# Patient Record
Sex: FEMALE | Race: WHITE | NOT HISPANIC OR LATINO | Employment: STUDENT | URBAN - METROPOLITAN AREA
[De-identification: names, ages, dates, MRNs, and addresses within clinical notes are randomized per-mention and may not be internally consistent; named-entity substitution may affect disease eponyms.]

---

## 2023-03-28 ENCOUNTER — OFFICE VISIT (OUTPATIENT)
Dept: FAMILY MEDICINE CLINIC | Facility: OTHER | Age: 18
End: 2023-03-28

## 2023-03-28 VITALS
WEIGHT: 157 LBS | OXYGEN SATURATION: 98 % | RESPIRATION RATE: 18 BRPM | DIASTOLIC BLOOD PRESSURE: 80 MMHG | SYSTOLIC BLOOD PRESSURE: 108 MMHG | TEMPERATURE: 98 F | HEART RATE: 71 BPM

## 2023-03-28 DIAGNOSIS — Z11.59 NEED FOR HEPATITIS C SCREENING TEST: ICD-10-CM

## 2023-03-28 DIAGNOSIS — E03.9 HYPOTHYROIDISM, UNSPECIFIED TYPE: Primary | ICD-10-CM

## 2023-03-28 DIAGNOSIS — Z11.4 ENCOUNTER FOR SCREENING FOR HUMAN IMMUNODEFICIENCY VIRUS (HIV): ICD-10-CM

## 2023-03-28 DIAGNOSIS — M25.511 ARTHRALGIA OF RIGHT SHOULDER REGION: ICD-10-CM

## 2023-03-28 DIAGNOSIS — R53.83 OTHER FATIGUE: ICD-10-CM

## 2023-03-28 DIAGNOSIS — M25.552 ARTHRALGIA OF LEFT HIP: ICD-10-CM

## 2023-03-28 DIAGNOSIS — M79.18 MYALGIA, MULTIPLE SITES: ICD-10-CM

## 2023-03-28 RX ORDER — LEVOTHYROXINE AND LIOTHYRONINE 76; 18 UG/1; UG/1
TABLET ORAL
COMMUNITY
Start: 2023-02-13

## 2023-03-28 RX ORDER — EPINEPHRINE 0.3 MG/.3ML
INJECTION SUBCUTANEOUS
COMMUNITY
Start: 2023-01-05

## 2023-03-28 NOTE — PATIENT INSTRUCTIONS
Fatigue   WHAT YOU NEED TO KNOW:   Fatigue is mental and physical exhaustion that does not get better with rest  Fatigue may make daily activities difficult or cause extreme sleepiness  It is normal to feel tired sometimes, but long-term fatigue may be a sign of serious illness  DISCHARGE INSTRUCTIONS:   Return to the emergency department if:   You have chest pain  You have difficulty breathing  Contact your healthcare provider if:   You have a cough that gets worse, or does not go away  You see blood in your urine or bowel movement  You have numbness or tingling around your mouth or in an arm or leg  You faint, feel dizzy, or have vision changes  You have swelling in your lymph nodes  You are a woman and have vaginal bleeding that is not normal for you, or is not expected  You lose weight without trying, or you have trouble eating  You feel weak or have muscle pain  You have pain or swelling in your joints  You have questions or concerns about your condition or care  Follow up with your healthcare provider as directed: You may need more tests  Your healthcare provider may also refer you to a specialist  Write down your questions so you remember to ask them during your visits  Manage fatigue:   Keep a fatigue diary  Include anything that makes you feel more tired or less tired  Bring the diary with you to follow-up visits with your provider  Exercise as directed  Exercise can help you feel more alert  Exercise can also help you manage stress or relieve depression  Try to get at least 30 minutes of exercise most days of the week  Keep a regular sleep schedule  Go to bed and wake up at the same times every day  Limit naps to 1 hour each day  A nap can improve fatigue, but a long nap may make it harder to go to sleep at night  Plan and limit your activities  Limit the number of activities such as shopping and cleaning you do each day   If possible, try to spread out your trips throughout the week  Plan ahead so you are not rushing to get something done  Only do activities that you have the energy to complete  Take breaks between activities  Ask for help if you need it  Another person may be able to drive you or help with daily activities  Eat a variety of healthy foods  Healthy foods include fruits, vegetables, whole-grain breads, low-fat dairy products, beans, lean meats, and fish  Good nutrition can help manage fatigue  Limit caffeine and alcohol  These can make it difficult to fall or stay asleep  Women should limit alcohol to 1 drink a day  Men should limit alcohol to 2 drinks a day  A drink of alcohol is 12 ounces of beer, 5 ounces of wine, or 1½ ounces of liquor  Ask our healthcare provider how much caffeine is safe for you  Do not smoke  Nicotine and other chemicals in cigarettes and cigars can cause lung damage and increase fatigue  Ask your healthcare provider for information if you currently smoke and need help to quit  E-cigarettes or smokeless tobacco still contain nicotine  Talk to your healthcare provider before you use these products  © Copyright [employer] Claude 2022 Information is for End User's use only and may not be sold, redistributed or otherwise used for commercial purposes  The above information is an  only  It is not intended as medical advice for individual conditions or treatments  Talk to your doctor, nurse or pharmacist before following any medical regimen to see if it is safe and effective for you

## 2023-03-28 NOTE — PROGRESS NOTES
Name: Romy Walker      : 2005      MRN: [id number]  Encounter Provider: Gertrude Saeed MD  Encounter Date: 3/28/2023   Encounter department: [address] [place] [address] Dr Castanon  Hypothyroidism, unspecified type  · Abnormal thyroid function + fatigue and myalgias for several years  · Started on [place] (thyroid supplement 2-3 years ago) with no improvement of symptoms  · Discussed with previous provider and d/c medication yesterday  · Will recheck Thyroid labs in 4-6 weeks and decide management course at that time  -     TSH, 3rd generation with Free T4 reflex; Future; Expected date: 2023    2  Other fatigue  · Worsening fatigue and generalized body aches x 2- 3 years  · Abnormal thyroid function detected and started on thyroid supplement without significant improvement  · Also stopped OCPs (Junel Fe) with no change in her symptoms  · Also has arthropathies and follows with ortho and a chiropractor  · Also has life stressors in the form of school  · Cause may be multifactorial; would like to investigate for organic causes  -     Comprehensive metabolic panel; Future  -     CBC and differential; Future  -     Iron Panel (Includes Ferritin, Iron Sat%, Iron, and TIBC); Future  -     Vitamin D 25 hydroxy; Future  -     Vitamin B12; Future  -     Folate; Future    3  Right shoulder pain, unspecified chronicity  · Intermittent right shoulder and right upper back pain w/ associated burning/numbness travelling down the right arm occasionally for several years  · H/O of increased physical activity and athleticism with overuse as potential cause for current symptoms  · H/O of partial labral tear of left hip with piriformis syndrome; follows with ortho, chiropractor and completed several bouts of PT  · Patient requesting MRI of right shoulder;  No previous imaging ever done of joint in question  · Recommend starting with XR and advised to follow up with Ortho for evaluation and MRI order based on their assessment  -     XR shoulder 2+ vw right; Future; Expected date: 03/28/2023  -     XR spine thoracic 3 vw; Future; Expected date: 03/28/2023    4  Need for hepatitis C screening test  -     Hepatitis C Antibody ([facility]); Future    5  Encounter for screening for human immunodeficiency virus (HIV)  -     : HIV 1/2 AB/AG w Reflex [facility] for 2 yr old and above; Future    F/U in 1-2 months for Annual Physical and to review labs/imaging       Subjective      HPI   Severa Maxin is a 21yoF who presents as a new patient to establish care and for acute concerns  She attends [employer] and is studying to become a PA  She has a past medical history significant for thyroid dysfunction as well as a prolonged history of fatigue and generalized body aches  She was started on a thyroid supplement called Ketty and discontinued her OCPs (Junel Fe) with no improvement of her fatigue or generalized myalgias  Today the patient reports that she discussed her concerns with her previous doctor and asked if she could stop taking the thyroid supplement medication  She feels like she does not truly have hypothyroidism  She discontinued her medication yesterday with the blessing of the provider  At this time, she has not experienced any immediate adverse reactions after stopping the medication  She is also considering restarting her birth control since she did not witness any improvements/chnages after it's discontinuation but plans to discuss further with OB/GYN  The patient also has a history of a partial labral tear of her left hip that was being managed by her orthopedic doctor and underwent multiple bouts of physical therapy  She had a more recent diagnosis of piriformis syndrome that is contributing to the pathology of her left hip and is still trying to find more time to incorporate home exercises to manage those symptoms   History is also pertinent for a bulging disc @ L4/L5 and she follows with a chiropractor  Today, the patient reports chronic pain of the right shoulder and right upper back that has not been addressed since she has been focusing more on her left hip pathology  She states that she often experiences some burning and numbness travelling down the right arm intermittently  Prior to these arthropathy diagnoses, the patient was very active and athletic  She denies other concerns at this time including headache, cough, CP, SOB, abdominal pain, N/V/D or weakness  Review of Systems   Constitutional: Positive for fatigue  Negative for chills and fever  HENT: Negative for congestion and rhinorrhea  Eyes: Negative for visual disturbance  Respiratory: Negative for shortness of breath  Cardiovascular: Negative for chest pain  Gastrointestinal: Negative for abdominal pain  Genitourinary: Negative for dysuria  Musculoskeletal: Positive for arthralgias, back pain and myalgias  Negative for joint swelling and neck pain  Neurological: Negative for dizziness and headaches  Current Outpatient Medications on File Prior to Visit   Medication Sig   • EPINEPHrine (EPIPEN) 0 3 mg/0 3 mL SOAJ 0 3 MILLIGRAMS INTRAMUSCULAR 1X USE AS DIRECTED  IF USED, MUST CALL 911 AND PROCEED TO NEAREST ER  • thyroid (ARMOUR) 120 MG tablet  (Patient not taking: Reported on 3/28/2023)       Objective     /80   Pulse 71   Temp 98 °F (36 7 °C)   Resp 18   Wt 71 2 kg (157 lb)   SpO2 98%     Physical Exam  Vitals and nursing note reviewed  Constitutional:       General: She is not in acute distress  Appearance: Normal appearance  HENT:      Head: Normocephalic and atraumatic  Eyes:      Extraocular Movements: Extraocular movements intact  Conjunctiva/sclera: Conjunctivae normal    Cardiovascular:      Rate and Rhythm: Normal rate and regular rhythm  Pulses: Normal pulses  Heart sounds: Normal heart sounds  No murmur heard    Pulmonary:      Effort: Pulmonary effort is normal  No respiratory distress  Breath sounds: Normal breath sounds  Abdominal:      Palpations: Abdomen is soft  Tenderness: There is no abdominal tenderness  Musculoskeletal:      Cervical back: Normal range of motion and neck supple  Skin:     General: Skin is warm and dry  Neurological:      General: No focal deficit present  Mental Status: She is alert and oriented to person, place, and time  Cranial Nerves: No cranial nerve deficit  Sensory: No sensory deficit  Motor: No weakness  Psychiatric:         Mood and Affect: Mood normal  Mood is not anxious           Behavior: Behavior normal        Connie Watson MD

## 2023-04-26 ENCOUNTER — APPOINTMENT (OUTPATIENT)
Dept: LAB | Facility: CLINIC | Age: 18
End: 2023-04-26

## 2023-04-26 DIAGNOSIS — Z11.4 ENCOUNTER FOR SCREENING FOR HUMAN IMMUNODEFICIENCY VIRUS (HIV): ICD-10-CM

## 2023-04-26 DIAGNOSIS — M79.18 MYALGIA, MULTIPLE SITES: ICD-10-CM

## 2023-04-26 DIAGNOSIS — M25.552 ARTHRALGIA OF LEFT HIP: ICD-10-CM

## 2023-04-26 DIAGNOSIS — R53.83 OTHER FATIGUE: ICD-10-CM

## 2023-04-26 DIAGNOSIS — M25.511 ARTHRALGIA OF RIGHT SHOULDER REGION: ICD-10-CM

## 2023-04-26 LAB
25(OH)D3 SERPL-MCNC: 20.1 NG/ML (ref 30–100)
ALBUMIN SERPL BCP-MCNC: 4.1 G/DL (ref 3.5–5)
ALP SERPL-CCNC: 68 U/L (ref 46–116)
ALT SERPL W P-5'-P-CCNC: 36 U/L (ref 12–78)
ANA SER QL IA: NEGATIVE
ANION GAP SERPL CALCULATED.3IONS-SCNC: 7 MMOL/L (ref 4–13)
AST SERPL W P-5'-P-CCNC: 19 U/L (ref 5–45)
BASOPHILS # BLD AUTO: 0.04 THOUSANDS/ΜL (ref 0–0.1)
BASOPHILS NFR BLD AUTO: 1 % (ref 0–1)
BILIRUB SERPL-MCNC: 0.28 MG/DL (ref 0.2–1)
BUN SERPL-MCNC: 11 MG/DL (ref 5–25)
CALCIUM SERPL-MCNC: 9.4 MG/DL (ref 8.3–10.1)
CHLORIDE SERPL-SCNC: 108 MMOL/L (ref 96–108)
CO2 SERPL-SCNC: 23 MMOL/L (ref 21–32)
CREAT SERPL-MCNC: 0.82 MG/DL (ref 0.6–1.3)
CRP SERPL QL: <3 MG/L
EOSINOPHIL # BLD AUTO: 0.06 THOUSAND/ΜL (ref 0–0.61)
EOSINOPHIL NFR BLD AUTO: 1 % (ref 0–6)
ERYTHROCYTE [DISTWIDTH] IN BLOOD BY AUTOMATED COUNT: 13.3 % (ref 11.6–15.1)
ERYTHROCYTE [SEDIMENTATION RATE] IN BLOOD: 5 MM/HOUR (ref 0–19)
FERRITIN SERPL-MCNC: 11 NG/ML (ref 8–388)
FOLATE SERPL-MCNC: >20 NG/ML (ref 3.1–17.5)
GFR SERPL CREATININE-BSD FRML MDRD: 103 ML/MIN/1.73SQ M
GLUCOSE P FAST SERPL-MCNC: 89 MG/DL (ref 65–99)
HCT VFR BLD AUTO: 39.1 % (ref 34.8–46.1)
HCV AB SER QL: NORMAL
HGB BLD-MCNC: 12.4 G/DL (ref 11.5–15.4)
HIV 1+2 AB+HIV1 P24 AG SERPL QL IA: NORMAL
HIV 2 AB SERPL QL IA: NORMAL
HIV1 AB SERPL QL IA: NORMAL
HIV1 P24 AG SERPL QL IA: NORMAL
IMM GRANULOCYTES # BLD AUTO: 0.01 THOUSAND/UL (ref 0–0.2)
IMM GRANULOCYTES NFR BLD AUTO: 0 % (ref 0–2)
IRON SATN MFR SERPL: 18 % (ref 15–50)
IRON SERPL-MCNC: 89 UG/DL (ref 50–170)
LYMPHOCYTES # BLD AUTO: 1.55 THOUSANDS/ΜL (ref 0.6–4.47)
LYMPHOCYTES NFR BLD AUTO: 35 % (ref 14–44)
MCH RBC QN AUTO: 29.7 PG (ref 26.8–34.3)
MCHC RBC AUTO-ENTMCNC: 31.7 G/DL (ref 31.4–37.4)
MCV RBC AUTO: 94 FL (ref 82–98)
MONOCYTES # BLD AUTO: 0.36 THOUSAND/ΜL (ref 0.17–1.22)
MONOCYTES NFR BLD AUTO: 8 % (ref 4–12)
NEUTROPHILS # BLD AUTO: 2.45 THOUSANDS/ΜL (ref 1.85–7.62)
NEUTS SEG NFR BLD AUTO: 55 % (ref 43–75)
NRBC BLD AUTO-RTO: 0 /100 WBCS
PLATELET # BLD AUTO: 372 THOUSANDS/UL (ref 149–390)
PMV BLD AUTO: 10.2 FL (ref 8.9–12.7)
POTASSIUM SERPL-SCNC: 4.3 MMOL/L (ref 3.5–5.3)
PROT SERPL-MCNC: 7.5 G/DL (ref 6.4–8.4)
RBC # BLD AUTO: 4.18 MILLION/UL (ref 3.81–5.12)
SODIUM SERPL-SCNC: 138 MMOL/L (ref 135–147)
TIBC SERPL-MCNC: 492 UG/DL (ref 250–450)
TSH SERPL DL<=0.05 MIU/L-ACNC: 1.86 UIU/ML (ref 0.45–4.5)
VIT B12 SERPL-MCNC: 704 PG/ML (ref 100–900)
WBC # BLD AUTO: 4.47 THOUSAND/UL (ref 4.31–10.16)

## 2023-04-27 ENCOUNTER — APPOINTMENT (OUTPATIENT)
Dept: RADIOLOGY | Facility: CLINIC | Age: 18
End: 2023-04-27

## 2023-04-27 DIAGNOSIS — M25.511 ARTHRALGIA OF RIGHT SHOULDER REGION: ICD-10-CM

## 2023-04-27 LAB — RHEUMATOID FACT SER QL LA: NEGATIVE

## 2023-04-28 LAB
CARDIOLIPIN IGA SER IA-ACNC: 1.4
CARDIOLIPIN IGG SER IA-ACNC: 0.9
CARDIOLIPIN IGM SER IA-ACNC: 2

## 2023-05-04 ENCOUNTER — OFFICE VISIT (OUTPATIENT)
Dept: FAMILY MEDICINE CLINIC | Facility: OTHER | Age: 18
End: 2023-05-04

## 2023-05-04 VITALS
BODY MASS INDEX: 24.77 KG/M2 | OXYGEN SATURATION: 98 % | HEIGHT: 67 IN | TEMPERATURE: 98 F | DIASTOLIC BLOOD PRESSURE: 80 MMHG | SYSTOLIC BLOOD PRESSURE: 104 MMHG | RESPIRATION RATE: 18 BRPM | HEART RATE: 67 BPM | WEIGHT: 157.8 LBS

## 2023-05-04 DIAGNOSIS — R53.83 OTHER FATIGUE: ICD-10-CM

## 2023-05-04 DIAGNOSIS — E55.9 VITAMIN D INSUFFICIENCY: ICD-10-CM

## 2023-05-04 DIAGNOSIS — M25.511 ARTHRALGIA OF RIGHT SHOULDER REGION: ICD-10-CM

## 2023-05-04 DIAGNOSIS — Z00.00 ANNUAL PHYSICAL EXAM: Primary | ICD-10-CM

## 2023-05-04 DIAGNOSIS — E03.9 HYPOTHYROIDISM, UNSPECIFIED TYPE: ICD-10-CM

## 2023-05-04 RX ORDER — MINOCYCLINE 40 MG/G
AEROSOL, FOAM TOPICAL
COMMUNITY
Start: 2023-05-01

## 2023-05-04 NOTE — PROGRESS NOTES
ADULT ANNUAL [address]  [facility]    NAME: Sang Alvarez  AGE: 19 y o  SEX: female  : 2005     DATE: 2023     Assessment and Plan:     Problem List Items Addressed This Visit    None  Visit Diagnoses     Annual physical exam    -  Primary    BMI 24 0-24 9, adult        Hypothyroidism, unspecified type  - Discontinued [place] (thyroid supplement appx 1 month ago)  - Repeat labs show normal TSH  - Past medical records and labs received confirming diagnosis of hypothyroidism  - Will recheck TSH labs in 6 months        Relevant Orders    TSH, 3rd generation with Free T4 reflex    Vitamin D insufficiency   - recommend taking vitamin D 1,000-2,000 units OTC daily       Other fatigue  - all other labs were normal        Arthralgia of right shoulder region    XR was normal  Inflammatory markers WNL        Patient would like to return to discuss birth control options and anxiety  Immunizations and preventive care screenings were discussed with patient today  Appropriate education was printed on patient's after visit summary  Counseling:  Alcohol/drug use: discussed moderation in alcohol intake, the recommendations for healthy alcohol use, and avoidance of illicit drug use  Dental Health: discussed importance of regular tooth brushing, flossing, and dental visits  Injury prevention: discussed safety/seat belts, safety helmets, smoke detectors, carbon dioxide detectors, and smoking near bedding or upholstery  Sexual health: discussed sexually transmitted diseases, partner selection, use of condoms, avoidance of unintended pregnancy, and contraceptive alternatives  · Exercise: the importance of regular exercise/physical activity was discussed  Recommend exercise 3-5 times per week for at least 30 minutes  Depression Screening and Follow-up Plan: Patient's depression screening was positive with a PHQ-2 score of 5  Their PHQ-9 score was 12  Patient assessed for underlying major depression  Brief counseling provided and recommend additional follow-up/re-evaluation next office visit  Return in 1 year (on 2024) for Discuss anxiety and treatment options and birth control  Chief Complaint:     Chief Complaint   Patient presents with    Physical Exam      History of Present Illness:     Adult Annual Physical   Patient here for a comprehensive physical exam  The patient reports extensive history of generalized pain and joint issues  Follows with an orthopedic doctor  Recent evaluation for rheumatologic cause with inflammatory markers were negative  Patient also sent in her medical records which confirmed diagnosis of hypothyroidism  Patient reports no change after discontinuing thyroid supplement and labs show TSH in normal range  The patient also reports history of anxiety and follows with a therapist at school, however has not undergone formal evaluation  Diet and Physical Activity  · Diet/Nutrition: well balanced diet, consuming 3-5 servings of fruits/vegetables daily, adequate fiber intake and adequate whole grain intake  · Exercise: walking, moderate cardiovascular exercise, 5-7 times a week on average and 1-2 hours on average  Depression Screening  PHQ-2/9 Depression Screening    Little interest or pleasure in doing things: 3 - nearly every day  Feeling down, depressed, or hopeless: 2 - more than half the days  Trouble falling or staying asleep, or sleeping too much: 1 - several days  Feeling tired or having little energy: 3 - nearly every day  Poor appetite or overeatin - more than half the days  Feeling bad about yourself - or that you are a failure or have let yourself or your family down: 1 - several days  Trouble concentrating on things, such as reading the newspaper or watching television: 0 - not at all  Moving or speaking so slowly that other people could have noticed   Or the opposite - being so fidgety or restless that you have been moving around a lot more than usual: 0 - not at all  Thoughts that you would be better off dead, or of hurting yourself in some way: 0 - not at all  PHQ-2 Score: 5  PHQ-2 Interpretation: POSITIVE depression screen  PHQ-9 Score: 12   PHQ-9 Interpretation: Moderate depression        WARREN-7 Flowsheet Screening    Flowsheet Row Most Recent Value   Over the last 2 weeks, how often have you been bothered by any of the following problems? Feeling nervous, anxious, or on edge 3   Not being able to stop or control worrying 1   Worrying too much about different things 2   Trouble relaxing 3   Being so restless that it is hard to sit still 1   Becoming easily annoyed or irritable 0   Feeling afraid as if something awful might happen 0   WARREN-7 Total Score 10          General Health  · Sleep: sleeps well and gets more than 8 hours of sleep on average  · Hearing: normal - bilateral   · Vision: no vision problems and most recent eye exam >1 year ago  · Dental: regular dental visits, brushes teeth twice daily and flosses teeth occasionally  /GYN Health  · Last menstrual period: 1 month ago  · Contraceptive method: none right now, would like to restart  · History of STDs?: no      Review of Systems:     Review of Systems   Constitutional: Positive for fatigue  Negative for chills and fever  HENT: Negative for congestion and rhinorrhea  Eyes: Negative for visual disturbance  Respiratory: Negative for shortness of breath  Cardiovascular: Negative for chest pain  Gastrointestinal: Negative for abdominal pain  Genitourinary: Negative for dysuria  Musculoskeletal: Positive for arthralgias, back pain and myalgias  Negative for joint swelling and neck pain  Neurological: Negative for dizziness and headaches        Past Medical History:     Past Medical History:   Diagnosis Date    Hypothyroidism       Past Surgical History:     Past Surgical History:   Procedure Laterality Date "   WISDOM TOOTH EXTRACTION        Social History:     Social History     Socioeconomic History    Marital status: Single     Spouse name: None    Number of children: None    Years of education: None    Highest education level: None   Occupational History    None   Tobacco Use    Smoking status: Never    Smokeless tobacco: Never   Vaping Use    Vaping Use: Never used   Substance and Sexual Activity    Alcohol use: Yes     Comment: social    Drug use: Never    Sexual activity: None   Other Topics Concern    None   Social History Narrative    None     Social Determinants of Health     Financial Resource Strain: Not on file   Food Insecurity: Not on file   Transportation Needs: Not on file   Physical Activity: Not on file   Stress: Not on file   Social Connections: Not on file   Intimate Partner Violence: Not on file   Housing Stability: Not on file      Family History:     History reviewed  No pertinent family history  Current Medications:     Current Outpatient Medications   Medication Sig Dispense Refill    Amzeeq 4 % FOAM Apply daily FOR acne using A SMALL AMOUNT      EPINEPHrine (EPIPEN) 0 3 mg/0 3 mL SOAJ 0 3 MILLIGRAMS INTRAMUSCULAR 1X USE AS DIRECTED  IF USED, MUST CALL 911 AND PROCEED TO NEAREST ER   thyroid (ARMOUR) 120 MG tablet  (Patient not taking: Reported on 3/28/2023)       No current facility-administered medications for this visit  Allergies: Allergies   Allergen Reactions    Fish Allergy - Food Allergy Itching, Swelling, Throat Swelling and Tongue Swelling    Shellfish Allergy - Food Allergy Cough, Itching, Swelling, Throat Swelling and Tongue Swelling      Physical Exam:     /80   Pulse 67   Temp 98 °F (36 7 °C)   Resp 18   Ht 5' 7\" (1 702 m)   Wt 71 6 kg (157 lb 12 8 oz)   SpO2 98%   BMI 24 71 kg/m²     Physical Exam  Vitals and nursing note reviewed  Constitutional:       General: She is not in acute distress  Appearance: Normal appearance   She " is well-developed  HENT:      Head: Normocephalic and atraumatic  Right Ear: Tympanic membrane, ear canal and external ear normal       Left Ear: Tympanic membrane, ear canal and external ear normal       Nose: Nose normal       Mouth/Throat:      Mouth: Mucous membranes are moist       Pharynx: Oropharynx is clear  Eyes:      Extraocular Movements: Extraocular movements intact  Conjunctiva/sclera: Conjunctivae normal    Cardiovascular:      Rate and Rhythm: Normal rate and regular rhythm  Heart sounds: Normal heart sounds  No murmur heard  Pulmonary:      Effort: Pulmonary effort is normal  No respiratory distress  Breath sounds: Normal breath sounds  Abdominal:      General: Bowel sounds are normal       Palpations: Abdomen is soft  Tenderness: There is no abdominal tenderness  Musculoskeletal:      Cervical back: Normal range of motion and neck supple  Skin:     General: Skin is warm and dry  Neurological:      General: No focal deficit present  Mental Status: She is alert and oriented to person, place, and time     Psychiatric:         Behavior: Behavior normal           Mandi Frost MD   [facility] [email or web address]] 81

## 2023-05-04 NOTE — PATIENT INSTRUCTIONS
TAKE VITAMIN D OVER THE COUNTER: 1,000 - 2,000 MCG DAILY  Wellness Visit for Adults   AMBULATORY CARE:   A wellness visit  is when you see your healthcare provider to get screened for health problems  Your healthcare provider will also give you advice on how to stay healthy  Write down your questions so you remember to ask them  Ask your healthcare provider how often you should have a wellness visit  What happens at a wellness visit:  Your healthcare provider will ask about your health, and your family history of health problems  This includes high blood pressure, heart disease, and cancer  He or she will ask if you have symptoms that concern you, if you smoke, and about your mood  You may also be asked about your intake of medicines, supplements, food, and alcohol  Any of the following may be done: Your weight  will be checked  Your height may also be checked so your body mass index (BMI) can be calculated  Your BMI shows if you are at a healthy weight  Your blood pressure  and heart rate will be checked  Your temperature may also be checked  Blood and urine tests  may be done  Blood tests may be done to check your cholesterol levels  Abnormal cholesterol levels increase your risk for heart disease and stroke  You may also need a blood or urine test to check for diabetes if you are at increased risk  Urine tests may be done to look for signs of an infection or kidney disease  A physical exam  includes checking your heartbeat and lungs with a stethoscope  Your healthcare provider may also check your skin to look for sun damage  Screening tests  may be recommended  A screening test is done to check for diseases that may not cause symptoms  The screening tests you may need depend on your age, gender, family history, and lifestyle habits  For example, colorectal screening may be recommended if you are 48years old or older      Screening tests you need if you are a woman:   A Pap smear  is used to screen for cervical cancer  Pap smears are usually done every 3 to 5 years depending on your age  You may need them more often if you have had abnormal Pap smear test results in the past  Ask your healthcare provider how often you should have a Pap smear  A mammogram  is an x-ray of your breasts to screen for breast cancer  Experts recommend mammograms every 2 years starting at age 48 years  You may need a mammogram at age 52 years or younger if you have an increased risk for breast cancer  Talk to your healthcare provider about when you should start having mammograms and how often you need them  Vaccines you may need:   Get an influenza vaccine  every year  The influenza vaccine protects you from the flu  Several types of viruses cause the flu  The viruses change over time, so new vaccines are made each year  Get a tetanus-diphtheria (Td) booster vaccine  every 10 years  This vaccine protects you against tetanus and diphtheria  Tetanus is a severe infection that may cause painful muscle spasms and lockjaw  Diphtheria is a severe bacterial infection that causes a thick covering in the back of your mouth and throat  Get a human papillomavirus (HPV) vaccine  if you are female and aged 23 to 32 or male 23 to 24 and never received it  This vaccine protects you from HPV infection  HPV is the most common infection spread by sexual contact  HPV may also cause vaginal, penile, and anal cancers  Get a pneumococcal vaccine  if you are aged 72 years or older  The pneumococcal vaccine is an injection given to protect you from pneumococcal disease  Pneumococcal disease is an infection caused by pneumococcal bacteria  The infection may cause pneumonia, meningitis, or an ear infection  Get a shingles vaccine  if you are 60 or older, even if you have had shingles before  The shingles vaccine is an injection to protect you from the varicella-zoster virus  This is the same virus that causes chickenpox   Shingles is a painful rash that develops in people who had chickenpox or have been exposed to the virus  How to eat healthy:  My Plate is a model for planning healthy meals  It shows the types and amounts of foods that should go on your plate  Fruits and vegetables make up about half of your plate, and grains and protein make up the other half  A serving of dairy is included on the side of your plate  The amount of calories and serving sizes you need depends on your age, gender, weight, and height  Examples of healthy foods are listed below:  Eat a variety of vegetables  such as dark green, red, and orange vegetables  You can also include canned vegetables low in sodium (salt) and frozen vegetables without added butter or sauces  Eat a variety of fresh fruits , canned fruit in 100% juice, frozen fruit, and dried fruit  Include whole grains  At least half of the grains you eat should be whole grains  Examples include whole-wheat bread, wheat pasta, brown rice, and whole-grain cereals such as oatmeal     Eat a variety of protein foods such as seafood (fish and shellfish), lean meat, and poultry without skin (turkey and chicken)  Examples of lean meats include pork leg, shoulder, or tenderloin, and beef round, sirloin, tenderloin, and extra lean ground beef  Other protein foods include eggs and egg substitutes, beans, peas, soy products, nuts, and seeds  Choose low-fat dairy products such as skim or 1% milk or low-fat yogurt, cheese, and cottage cheese  Limit unhealthy fats  such as butter, hard margarine, and shortening  Exercise:  Exercise at least 30 minutes per day on most days of the week  Some examples of exercise include walking, biking, dancing, and swimming  You can also fit in more physical activity by taking the stairs instead of the elevator or parking farther away from stores  Include muscle strengthening activities 2 days each week  Regular exercise provides many health benefits   It helps you manage your weight, and decreases your risk for type 2 diabetes, heart disease, stroke, and high blood pressure  Exercise can also help improve your mood  Ask your healthcare provider about the best exercise plan for you  General health and safety guidelines:   Do not smoke  Nicotine and other chemicals in cigarettes and cigars can cause lung damage  Ask your healthcare provider for information if you currently smoke and need help to quit  E-cigarettes or smokeless tobacco still contain nicotine  Talk to your healthcare provider before you use these products  Limit alcohol  A drink of alcohol is 12 ounces of beer, 5 ounces of wine, or 1½ ounces of liquor  Lose weight, if needed  Being overweight increases your risk of certain health conditions  These include heart disease, high blood pressure, type 2 diabetes, and certain types of cancer  Protect your skin  Do not sunbathe or use tanning beds  Use sunscreen with a SPF 15 or higher  Apply sunscreen at least 15 minutes before you go outside  Reapply sunscreen every 2 hours  Wear protective clothing, hats, and sunglasses when you are outside  Drive safely  Always wear your seatbelt  Make sure everyone in your car wears a seatbelt  A seatbelt can save your life if you are in an accident  Do not use your cell phone when you are driving  This could distract you and cause an accident  Pull over if you need to make a call or send a text message  Practice safe sex  Use latex condoms if are sexually active and have more than one partner  Your healthcare provider may recommend screening tests for sexually transmitted infections (STIs)  Wear helmets, lifejackets, and protective gear  Always wear a helmet when you ride a bike or motorcycle, go skiing, or play sports that could cause a head injury  Wear protective equipment when you play sports  Wear a lifejacket when you are on a boat or doing water sports      © Copyright [employer]ative 2022 Information is for End User's use only and may not be sold, redistributed or otherwise used for commercial purposes  The above information is an  only  It is not intended as medical advice for individual conditions or treatments  Talk to your doctor, nurse or pharmacist before following any medical regimen to see if it is safe and effective for you

## 2023-05-16 ENCOUNTER — TELEMEDICINE (OUTPATIENT)
Dept: FAMILY MEDICINE CLINIC | Facility: OTHER | Age: 18
End: 2023-05-16

## 2023-05-16 DIAGNOSIS — Z30.09 ENCOUNTER FOR COUNSELING REGARDING CONTRACEPTION: ICD-10-CM

## 2023-05-16 DIAGNOSIS — F41.1 GAD (GENERALIZED ANXIETY DISORDER): Primary | ICD-10-CM

## 2023-05-16 DIAGNOSIS — F32.A DEPRESSION, UNSPECIFIED DEPRESSION TYPE: ICD-10-CM

## 2023-05-16 NOTE — PROGRESS NOTES
Virtual Regular Visit    Verification of patient location:    Patient is located at Other in the following state in which I hold an active license [place]    Assessment/Plan:    Problem List Items Addressed This Visit    None  Visit Diagnoses     WARREN (generalized anxiety disorder)  -  Primary  · GAD7: 10  · Onset 3-4 years ago, but worsened within the past year  · Started seeing her school counselor in college which has been somewhat helpful  · Patient is considering starting pharmacotherapy  · Patient is finished with the school year and will be returning home in [place] for the summer, therefore she would like to establish care with a local therapist and plans to re-evaluate need for medication in the near future    Depression, unspecified depression type  · PHQ9: 12  · Denies thoughts of self-harm, SI/HI        Encounter for counseling regarding contraception  · Previously taking OCPs (Junel Fe) 3-4 years ago but d/c in 10/2022 because she thought it contributed to fatigue and generalized body aches, however there was no improvement of her symptoms after she stopped taking  · Patient would like to restart birth control  She is considering OCPs vs IUD (for less systemic hormonal distribution)  · Pt not currently sexually active and plans to consider her options before making a decision  · Pt advised to use barrier contraceptives if engaging in sexual activity          F/U in 3 months to recheck mood and birth control options      Depression Screening Follow-up Plan: Patient's depression screening was positive with a PHQ-2 score of   Their PHQ-9 score was 12  Patient assessed for underlying major depression  They have no active suicidal ideations  Brief counseling provided and recommend additional follow-up/re-evaluation next office visit  Continue regular follow-up with their psychologist/therapist/psychiatrist who is managing their mental health condition(s)      Reason for visit is   Chief Complaint   Patient presents with   • Virtual Regular Visit      Encounter provider Noe Askew MD    Provider located at [address] [place] [id number]      Recent Visits  Date Type Provider Dept   05/16/23 Telemedicine Noe Askew MD [facility]   Showing recent visits within past 7 days and meeting all other requirements  Future Appointments  No visits were found meeting these conditions  Showing future appointments within next 150 days and meeting all other requirements       The patient was identified by name and date of birth  Kirsty Dangelo was informed that this is a telemedicine visit and that the visit is being conducted through the [employer]  She agrees to proceed     My office door was closed  No one else was in the room  She acknowledged consent and understanding of privacy and security of the video platform  The patient has agreed to participate and understands they can discontinue the visit at any time  Patient is aware this is a billable service  Subjective  Kirsty Dangelo is a 19 y o  female who presents via telemedicine visit to discuss symptoms of anxiety and depression  The patient reports that she started having symptoms of excessive worrying, trouble concentrating and feeling tense several years ago, during her pam year of HS  She has been dealing with life stressors including college and a sequelae of MSK pain following a sports injury  She also reports to a lesser degree, feeling depressed with low energy and change in appetite  The patient states that her symptoms worsened around March 2022 and she started seeing the school counselor at her college  She has been seeing them for approximately 1 year and endorses some improvement of her mood but it still fluctuates and does not feel adequately addressed  She is considering starting pharmacotherapy but would like to wait awhile before making that decision   The patient just completed this academic school year, temporarily removing one of her life stressors  She will be returning home for the summer and plans to establish care with a local behavioral counselor  She also reports that she is scheduled to follow up with her ortho specialist to address MSK concerns and possibly obtain an MRI, therein addressing another life stressor which will be helpful in improving her mood and disposition  The patient also wanted to discuss restarting birth control and wanted to discuss her options  She started taking OCPs (Junel Fe) 3-4 years ago, around the time that she started having the change in her mood  She also noticed some weight gain initially after starting the OCPs  She also developed symptoms of fatigue and generalized body aches  She was subsequently found to have hypothyroidism with elevated TSH and low T4 reflex  She was started on a thyroid supplement ([place]) and continued to take it for 2-3 years with no discernible improvement of her symptoms  The patient was concerned that her birth control was contributing to her overall symptoms, therefore she stopped taking them around 10/2022  Recently, the patient also discontinued her thyroid medication following discussion with her provider  Given the lack of improvement of her systemic symptoms, the patient felt that she did not need to be on the medication anymore  Recent labs showed normal TSH  The patient noticed no difference in her symptoms after discontinuing her birth control, so she is hoping to resume taking birth control, however she is considering OCPs vs IUD (for less systemic hormonal distribution)  She is not currently sexually active and would like consider her options awhile longer  She denies having any other complaints or concerns at this time          Past Medical History:   Diagnosis Date   • Hypothyroidism        Past Surgical History:   Procedure Laterality Date   • WISDOM TOOTH EXTRACTION         Current Outpatient Medications Medication Sig Dispense Refill   • Amzeeq 4 % FOAM Apply daily FOR acne using A SMALL AMOUNT     • EPINEPHrine (EPIPEN) 0 3 mg/0 3 mL SOAJ 0 3 MILLIGRAMS INTRAMUSCULAR 1X USE AS DIRECTED  IF USED, MUST CALL 911 AND PROCEED TO NEAREST ER  No current facility-administered medications for this visit  Allergies   Allergen Reactions   • Fish Allergy - Food Allergy Itching, Swelling, Throat Swelling and Tongue Swelling   • Shellfish Allergy - Food Allergy Cough, Itching, Swelling, Throat Swelling and Tongue Swelling       Review of Systems   Constitutional: Negative for chills and fever  Eyes: Negative for visual disturbance  Respiratory: Negative for shortness of breath  Cardiovascular: Negative for chest pain  Gastrointestinal: Negative for abdominal pain  Genitourinary: Negative for dysuria and menstrual problem  Musculoskeletal: Positive for arthralgias  Neurological: Negative for dizziness and headaches  Psychiatric/Behavioral: Positive for dysphoric mood  Negative for sleep disturbance  The patient is nervous/anxious  Video Exam    There were no vitals filed for this visit  Physical Exam  Nursing note reviewed  Constitutional:       General: She is not in acute distress  HENT:      Head: Normocephalic and atraumatic  Eyes:      Extraocular Movements: Extraocular movements intact  Conjunctiva/sclera: Conjunctivae normal    Pulmonary:      Effort: Pulmonary effort is normal  No respiratory distress  Neurological:      Mental Status: She is alert and oriented to person, place, and time     Psychiatric:         Behavior: Behavior normal

## 2023-10-09 ENCOUNTER — TELEPHONE (OUTPATIENT)
Dept: FAMILY MEDICINE CLINIC | Facility: OTHER | Age: 18
End: 2023-10-09

## 2023-10-09 NOTE — TELEPHONE ENCOUNTER
Left message on patients voicemail regarding setting up an appt for her as requested below       Hi, my name is Anna Lombardi date of birth 02/07/2002 and [age]. I'm looking to schedule a follow up TCP appointment with Doctor Buffy Dumont, preferably in her soonest available appointment at the [facility]. I would also be open to telehealth, but whatever she prefers may be easier to speak with her in person at the [facility]. I am currently at school in the moment, so I do have a class schedule I do have to work around, but I can be pretty flexible. I would like to see her as soon as possible regarding some health updates. Nothing too urgent but some health updates and some medication questions. She doesn't mind calling me or if you don't mind calling me back at [phone], I would like to call and schedule this appointment for as soon as possible or if I could be added to the wait list. I believe I was added throughout the summer but that was when I didn't need an appointment so that could be added back to that wait list again. This is Chasity Black calling to schedule a follow up appointment with Doctor Buffy Dumont. I also was looking to message her on my 1961 [employer] portal, but none of the messages ever went through. But I was having trouble with my portal. That might be a separate issue. I can call the [facility]. Thank you so much Have a great day. I got my number is [phone]. Thank you.

## 2023-10-12 ENCOUNTER — OFFICE VISIT (OUTPATIENT)
Dept: FAMILY MEDICINE CLINIC | Facility: OTHER | Age: 18
End: 2023-10-12
Payer: COMMERCIAL

## 2023-10-12 VITALS
BODY MASS INDEX: 24.33 KG/M2 | HEIGHT: 67 IN | HEART RATE: 97 BPM | OXYGEN SATURATION: 98 % | RESPIRATION RATE: 14 BRPM | DIASTOLIC BLOOD PRESSURE: 58 MMHG | WEIGHT: 155 LBS | SYSTOLIC BLOOD PRESSURE: 98 MMHG | TEMPERATURE: 98.4 F

## 2023-10-12 DIAGNOSIS — G89.29 CHRONIC LOW BACK PAIN WITHOUT SCIATICA, UNSPECIFIED BACK PAIN LATERALITY: ICD-10-CM

## 2023-10-12 DIAGNOSIS — Z23 ENCOUNTER FOR IMMUNIZATION: ICD-10-CM

## 2023-10-12 DIAGNOSIS — F41.9 ANXIETY: Primary | ICD-10-CM

## 2023-10-12 DIAGNOSIS — M79.10 MYALGIA: ICD-10-CM

## 2023-10-12 DIAGNOSIS — M54.50 CHRONIC LOW BACK PAIN WITHOUT SCIATICA, UNSPECIFIED BACK PAIN LATERALITY: ICD-10-CM

## 2023-10-12 DIAGNOSIS — M25.50 ARTHRALGIA, UNSPECIFIED JOINT: ICD-10-CM

## 2023-10-12 PROCEDURE — 90686 IIV4 VACC NO PRSV 0.5 ML IM: CPT

## 2023-10-12 PROCEDURE — 90471 IMMUNIZATION ADMIN: CPT

## 2023-10-12 RX ORDER — NIACINAMIDE 500 MG
TABLET ORAL
COMMUNITY
Start: 2023-08-01

## 2023-10-12 RX ORDER — ADAPALENE AND BENZOYL PEROXIDE 3; 25 MG/G; MG/G
GEL TOPICAL
COMMUNITY
Start: 2023-07-05

## 2023-10-12 NOTE — PROGRESS NOTES
Name: Aletha Juárez      : 2005      MRN: [id number]  Encounter Provider: Parrish Oro MD  Encounter Date: 10/12/2023   Encounter department: [address]     1. Anxiety    2. Chronic low back pain without sciatica, unspecified back pain laterality    3. Encounter for immunization  -     influenza vaccine, quadrivalent, 0.5 mL, preservative-free, for adult and pediatric patients 6 mos+ (AFLURIA, FLUARIX, FLULAVAL, FLUZONE)    4. Arthralgia, unspecified joint  -     Anti-U1 RNP Ab (RDL); Future  -     Ambulatory Referral to Rheumatology; Future    5. Myalgia  -     Anti-U1 RNP Ab (RDL); Future  -     Ambulatory Referral to Rheumatology; Future      18yoF w/ a PMHx of thyroid dysfunction, generalized arthralgia/myalgia and anxiety who presents for a follow up visit to recheck mood and chronic arthralgia. MCTD screening was recommended by an alternate provider. Therapist recommended that the patient consider pharmacotherapy for anxiety. The patient is also due for recheck of thyroid labs following d/c of thyroid medication several months ago. Plan  Will screen for anti RNP Ab; all other inflammatory marker labs were negative in 2023 (including ESR, CRP, RF, JOSIE & Cardiolipin)  Referral to Rheumatology  PHQ-9 score: 10  WARREN-7 score: 13  Discussed pharmacotherapy options for anxiety/depression. Patient is reluctant to start medication at this time. Once decided, will consider starting patient on Zoloft. Continue following with therapist  Continue PT and home exercises for chronic pain  F/U as needed  Patient due for next annual physical in 2024. Depression Screening and Follow-up Plan: Patient's depression screening was positive with a PHQ-2 score of 3. Their PHQ-9 score was 10. Patient declines further evaluation by mental health professional and/or medications. Brief counseling provided. Will re-evaluate at next office visit.  Continue regular follow-up with their mental health provider who is managing their mental health condition(s). Emotional and Mental Well-being, Sleep, Connectedness Assessment and Intervention:    PHQ-9 or GAD7 performed for initial evaluation or follow-up      Subjective      HPI  William Gibson is a 21yoF returns for a follow up visit to discuss continued episodes of chronic pain in multiple joints and re-assess anxiety. The patient has seen a chiropractor and orthopedic doctor for her joint pain complaints. Following her last visit in 5/2023, lifestyle changes was recommended and PT. She was told that she was not a candidate for surgical intervention. She reports PT has helped with her back pain, she became a vegetarian, changed her diet and lost a bit of weight. She also started low impact exercises like Pilates. Upon returning to school, her pain has gradually worsened. Her chiropractor did bring up a concern for mixed connective tissue disease given the persistence and generalized distribution of her symptoms. The patient is requesting if there are any tests or recommendations to screen for this condition. The patient also wanted to address her anxiety. Over the summer, she reports significant improvement in her symptoms. She found a therapist that she really liked, and felt great mentally, however upon returning back to school, her symptoms have worsened again. In addition, she had a fight with a friend that has contributed to the worsening of her symptoms as well. She feels as though the excessive worrying and stress is somewhat interfering with her daily tasks and daily living. She reports intermittent episodes of difficulty falling asleep, but it has not been too bothersome. She is considering adding pharmacotherapy to her treatment plan but has not definitively decided yet.   She denies any fever/chills, chest pain, palpitations, abdominal pain, N/V/D, thoughts of self-harm, SI/HI, hallucinations or other concerning symptoms at this time. Review of Systems   Constitutional:  Negative for chills and fever. HENT:  Positive for congestion. Eyes:  Negative for visual disturbance. Respiratory:  Negative for shortness of breath. Cardiovascular:  Negative for chest pain. Gastrointestinal:  Negative for abdominal pain, diarrhea and vomiting. Musculoskeletal:  Positive for arthralgias, back pain and myalgias. Negative for neck pain. Skin:  Positive for rash (acne). Neurological:  Negative for headaches. Psychiatric/Behavioral:  Negative for dysphoric mood. The patient is nervous/anxious. .  PHQ-2/9 Depression Screening    Little interest or pleasure in doing things: 2 - more than half the days  Feeling down, depressed, or hopeless: 1 - several days  Trouble falling or staying asleep, or sleeping too much: 1 - several days  Feeling tired or having little energy: 1 - several days  Poor appetite or overeatin - several days  Feeling bad about yourself - or that you are a failure or have let yourself or your family down: 2 - more than half the days  Trouble concentrating on things, such as reading the newspaper or watching television: 1 - several days  Moving or speaking so slowly that other people could have noticed. Or the opposite - being so fidgety or restless that you have been moving around a lot more than usual: 1 - several days  Thoughts that you would be better off dead, or of hurting yourself in some way: 0 - not at all  PHQ-2 Score: 3  PHQ-2 Interpretation: POSITIVE depression screen  PHQ-9 Score: 10   PHQ-9 Interpretation: Moderate depression          WARREN-7 Flowsheet Screening      Flowsheet Row Most Recent Value   Over the last 2 weeks, how often have you been bothered by any of the following problems?     Feeling nervous, anxious, or on edge 3   Not being able to stop or control worrying 3   Worrying too much about different things 3   Trouble relaxing 3   Being so restless that it is hard to sit still 1   Becoming easily annoyed or irritable 0   Feeling afraid as if something awful might happen 0   WARREN-7 Total Score 13            Current Outpatient Medications on File Prior to Visit   Medication Sig    niacinamide 500 mg tablet     Adapalene-Benzoyl Peroxide 0.3-2.5 % GEL APPLY AT BEDTIME TO FACE (Patient not taking: Reported on 10/12/2023)       Objective     BP 98/58   Pulse 97   Temp 98.4 °F (36.9 °C)   Resp 14   Ht 5' 7" (1.702 m)   Wt 70.3 kg (155 lb)   SpO2 98%   BMI 24.28 kg/m²     Physical Exam  Vitals and nursing note reviewed. Constitutional:       General: She is not in acute distress. Appearance: Normal appearance. She is normal weight. She is not ill-appearing. HENT:      Head: Normocephalic and atraumatic. Right Ear: External ear normal.      Left Ear: External ear normal.   Eyes:      Extraocular Movements: Extraocular movements intact. Conjunctiva/sclera: Conjunctivae normal.   Pulmonary:      Effort: Pulmonary effort is normal. No respiratory distress. Musculoskeletal:         General: Normal range of motion. Cervical back: Normal range of motion. Skin:     General: Skin is warm and dry. Findings: Acne present. Neurological:      General: No focal deficit present. Mental Status: She is alert and oriented to person, place, and time.    Psychiatric:         Behavior: Behavior normal.       Sebastian Ormond, MD

## 2023-11-28 ENCOUNTER — APPOINTMENT (OUTPATIENT)
Dept: LAB | Facility: CLINIC | Age: 18
End: 2023-11-28
Payer: COMMERCIAL

## 2023-11-28 DIAGNOSIS — M25.50 ARTHRALGIA, UNSPECIFIED JOINT: ICD-10-CM

## 2023-11-28 DIAGNOSIS — E03.9 ACQUIRED HYPOTHYROIDISM: ICD-10-CM

## 2023-11-28 DIAGNOSIS — M79.10 MYALGIA: ICD-10-CM

## 2023-11-28 LAB — TSH SERPL DL<=0.05 MIU/L-ACNC: 2.11 UIU/ML (ref 0.45–4.5)

## 2023-11-28 PROCEDURE — 86235 NUCLEAR ANTIGEN ANTIBODY: CPT

## 2023-11-28 PROCEDURE — 84443 ASSAY THYROID STIM HORMONE: CPT

## 2023-11-28 PROCEDURE — 36415 COLL VENOUS BLD VENIPUNCTURE: CPT

## 2023-12-05 ENCOUNTER — EVALUATION (OUTPATIENT)
Dept: PHYSICAL THERAPY | Facility: CLINIC | Age: 18
End: 2023-12-05
Payer: COMMERCIAL

## 2023-12-05 DIAGNOSIS — R20.2 PARESTHESIAS IN RIGHT HAND: ICD-10-CM

## 2023-12-05 DIAGNOSIS — M54.2 CERVICALGIA: Primary | ICD-10-CM

## 2023-12-05 PROCEDURE — 97162 PT EVAL MOD COMPLEX 30 MIN: CPT | Performed by: PHYSICAL THERAPIST

## 2023-12-05 PROCEDURE — 97140 MANUAL THERAPY 1/> REGIONS: CPT | Performed by: PHYSICAL THERAPIST

## 2023-12-05 NOTE — LETTER
2023    Tiera Felder DO  [address] [facility],  [address]    Patient: Jessie Brand   YOB: 2005   Date of Visit: 2023     Encounter Diagnosis     ICD-10-CM    1. Cervicalgia  M54.2       2. Paresthesias in right hand  R20.2           Dear Dr. Pat Storey: Thank you for your recent referral of Jessie Brand. Please review the attached evaluation summary from Mindy's recent visit. Please verify that you agree with the plan of care by signing the attached order. If you have any questions or concerns, please do not hesitate to call. I sincerely appreciate the opportunity to share in the care of one of your patients and hope to have another opportunity to work with you in the near future. Sincerely,    [place], PT      Referring Provider:      I certify that I have read the below Plan of Care and certify the need for these services furnished under this plan of treatment while under my care. Tiera Felder DO  [address] [facility],  [place] [address]  Via Fax: [phone]          PT Evaluation     Today's date: 2023  Patient name: Jessie Brand  : 2005  MRN: [id number]  Referring provider: Kassidy Gaines*  Dx:   Encounter Diagnosis     ICD-10-CM    1. Cervicalgia  M54.2       2. Paresthesias in right hand  R20.2                      Assessment  Assessment details: Pt is a 19 y.o. female presenting to PT with chronic pain of the neck with associated paresthesias into the R hand. PT findings include: positive thoracic outlet syndrome special tests, elevated R 1st rib, and positive peripheral neural tension tests involving ulnar, radial and median nn. Pt with pain relief with central neural mobilizations and with scapular elevation indicating likely pain generation more proximally in cervical or scalene triangle.  Pt educated on PT findings, anatomy, biomechanics, POC and rehab course. Pt will benefit from skilled PT to address above impairments to return to PLOF with less restriction and to reach functional goals. Impairments: abnormal or restricted ROM, impaired physical strength, lacks appropriate home exercise program and poor posture     Symptom irritability: lowUnderstanding of Dx/Px/POC: good   Prognosis: fair    Goals  1. Pt will demonstrate understanding of HEP and POC in order to improve compliance with course of rehab in 2 weeks. 2. Pt's thoracic ROM will improve by at least 25% in all directions to decrease loads to shoulders with overhead activity in 4 weeks. 3. Pt's pain will be 2/10 or less to allow pt to return to PLOF with least restriction by d/c.   4. Pt's shoulder ER/IR MMT will improve by at least 1 grade to improve shoulder stability by d/c.   5. Pt will maintain centralization of symptoms in order for pt to return to PLOF by d/c. Plan  Patient would benefit from: skilled physical therapy  Planned modality interventions: low level laser therapy  Planned therapy interventions: joint mobilization, manual therapy, patient education, postural training, strengthening, stretching, functional ROM exercises, home exercise program, flexibility, therapeutic activities and therapeutic exercise  Frequency: 2x week  Duration in weeks: 8  Treatment plan discussed with: patient      Subjective Evaluation    History of Present Illness  Mechanism of injury: Pt arrives to PT via referral from orthopedic surgery for chief complaint of neck, and R shoulder/UE pain with associated numbness/tingling. She states that this was going on for about 1 year. She attributes this to overhead type exercises (ex. Lat pull down) leading to some tingling but improving upon rest. She states that she subsequently switched to more pilates type exercises. At this time, she states aggravation of symptoms with typing, studying and more sensitivity.  She reports that there are no specific fingers involved with numbness/tingling, she will feel them all to varied degrees. PT is attending chiropractic with short term relief. 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 Currently doing a lot of writing/typing which aggravates her symptoms. Quality of life: good    Patient Goals  Patient goals for therapy: decreased pain, increased motion, increased strength and independence with ADLs/IADLs    Pain  Current pain ratin  At best pain ratin  At worst pain ratin  Location: R neck, R shoulder,  R hand  Quality: discomfort, radiating and dull ache        Objective    Shoulder ROM:  Shoulder WNL    Cervical ROM:   WNL all directions,  Cervical flex increases sx    Cervical joint mobility:  Normal UPA/CPA     1st rib mobility:  Elevated 1st rib B    Strength:  TBA    Special Tests:  Amy: NT  Adsons: positive  Positive peripheral nn tension in ulnar, median, radial nn. Precautions: Multiple joint pain      Manuals 12/5            Central gliders (R sideglide with R UE in 50% of nn tension position) DL             R 1st rib mob             Laser  R cerv. Consider pec minor release             Neuro Re-Ed             Thoracic foam roll protocol hugs            Median nn.  Floss  HEP                                                                             Ther Ex             Scalene stretch w/ strap HEP            Pec Minor stretch HEP            Row             Shoulder Ext             Thoracic ext in seated                                                    Ther Activity                                       Gait Training                                       Modalities

## 2023-12-05 NOTE — PROGRESS NOTES
PT Evaluation     Today's date: 2023  Patient name: Pk Song  : 2005  MRN: [id number]  Referring provider: Renetta Lee*  Dx:   Encounter Diagnosis     ICD-10-CM    1. Cervicalgia  M54.2       2. Paresthesias in right hand  R20.2                      Assessment  Assessment details: Pt is a 19 y.o. female presenting to PT with chronic pain of the neck with associated paresthesias into the R hand. PT findings include: positive thoracic outlet syndrome special tests, elevated R 1st rib, and positive peripheral neural tension tests involving ulnar, radial and median nn. Pt with pain relief with central neural mobilizations and with scapular elevation indicating likely pain generation more proximally in cervical or scalene triangle. Pt educated on PT findings, anatomy, biomechanics, POC and rehab course. Pt will benefit from skilled PT to address above impairments to return to PLOF with less restriction and to reach functional goals. Impairments: abnormal or restricted ROM, impaired physical strength, lacks appropriate home exercise program and poor posture     Symptom irritability: lowUnderstanding of Dx/Px/POC: good   Prognosis: fair    Goals  1. Pt will demonstrate understanding of HEP and POC in order to improve compliance with course of rehab in 2 weeks. 2. Pt's thoracic ROM will improve by at least 25% in all directions to decrease loads to shoulders with overhead activity in 4 weeks. 3. Pt's pain will be 2/10 or less to allow pt to return to PLOF with least restriction by d/c.   4. Pt's shoulder ER/IR MMT will improve by at least 1 grade to improve shoulder stability by d/c.   5. Pt will maintain centralization of symptoms in order for pt to return to PLOF by d/c.        Plan  Patient would benefit from: skilled physical therapy  Planned modality interventions: low level laser therapy  Planned therapy interventions: joint mobilization, manual therapy, patient education, postural training, strengthening, stretching, functional ROM exercises, home exercise program, flexibility, therapeutic activities and therapeutic exercise  Frequency: 2x week  Duration in weeks: 8  Treatment plan discussed with: patient      Subjective Evaluation    History of Present Illness  Mechanism of injury: Pt arrives to PT via referral from orthopedic surgery for chief complaint of neck, and R shoulder/UE pain with associated numbness/tingling. She states that this was going on for about 1 year. She attributes this to overhead type exercises (ex. Lat pull down) leading to some tingling but improving upon rest. She states that she subsequently switched to more pilates type exercises. At this time, she states aggravation of symptoms with typing, studying and more sensitivity. She reports that there are no specific fingers involved with numbness/tingling, she will feel them all to varied degrees. PT is attending chiropractic with short term relief. 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 Currently doing a lot of writing/typing which aggravates her symptoms. Quality of life: good    Patient Goals  Patient goals for therapy: decreased pain, increased motion, increased strength and independence with ADLs/IADLs    Pain  Current pain ratin  At best pain ratin  At worst pain ratin  Location: R neck, R shoulder,  R hand  Quality: discomfort, radiating and dull ache        Objective    Shoulder ROM:  Shoulder WNL    Cervical ROM:   WNL all directions,  Cervical flex increases sx    Cervical joint mobility:  Normal UPA/CPA     1st rib mobility:  Elevated 1st rib B    Strength:  TBA    Special Tests:  Amy: NT  Adsons: positive  Positive peripheral nn tension in ulnar, median, radial nn.          Precautions: Multiple joint pain      Manuals 12/5            Central gliders (R sideglide with R UE in 50% of nn tension position) DL             R 1st rib mob             Laser  R cerv.            Consider pec minor release             Neuro Re-Ed             Thoracic foam roll protocol hugs            Median nn.  Floss  HEP                                                                             Ther Ex             Scalene stretch w/ strap HEP            Pec Minor stretch HEP            Row             Shoulder Ext             Thoracic ext in seated                                                    Ther Activity                                       Gait Training                                       Modalities

## 2023-12-07 ENCOUNTER — OFFICE VISIT (OUTPATIENT)
Dept: PHYSICAL THERAPY | Facility: CLINIC | Age: 18
End: 2023-12-07
Payer: COMMERCIAL

## 2023-12-07 DIAGNOSIS — R20.2 PARESTHESIAS IN RIGHT HAND: ICD-10-CM

## 2023-12-07 DIAGNOSIS — M54.2 CERVICALGIA: Primary | ICD-10-CM

## 2023-12-07 PROCEDURE — 97140 MANUAL THERAPY 1/> REGIONS: CPT | Performed by: PHYSICAL THERAPIST

## 2023-12-07 PROCEDURE — 97110 THERAPEUTIC EXERCISES: CPT | Performed by: PHYSICAL THERAPIST

## 2023-12-07 NOTE — PROGRESS NOTES
Daily Note     Today's date: 2023  Patient name: Harry Snow  : 2005  MRN: [id number]  Referring provider: No ref. provider found  Dx:   Encounter Diagnosis     ICD-10-CM    1. Cervicalgia  M54.2       2. Paresthesias in right hand  R20.2                      Subjective: Pt reports increased radicular symptoms following eval.       Objective: See treatment diary below      Assessment: Pt had good tolerance to initiation of program. Reported relief after manual therapy. Plan: Continue per plan of care. Precautions: Multiple joint pain      Manuals            Central gliders (R sideglide with R UE in 50% of nn tension position) DL  MD           Unloaded extension   75% x30"           Manual median nerve glide  x10           PA glides to thoracic  MD           R 1st rib mob  MD           Laser  R cerv. R c-spine 4'           Consider pec minor release  MD           Neuro Re-Ed             Thoracic foam roll protocol hugs            Median nn.  Floss  HEP                                                                             Ther Ex             Scalene stretch w/ strap HEP 10x10"           Pec Minor stretch HEP            Row             Shoulder Ext             Thoracic ext in seated  3x30"           UBE (retro)  6'                                     Ther Activity                                       Gait Training                                       Modalities

## 2023-12-12 ENCOUNTER — OFFICE VISIT (OUTPATIENT)
Dept: PHYSICAL THERAPY | Facility: CLINIC | Age: 18
End: 2023-12-12
Payer: COMMERCIAL

## 2023-12-12 DIAGNOSIS — R20.2 PARESTHESIAS IN RIGHT HAND: ICD-10-CM

## 2023-12-12 DIAGNOSIS — M54.2 CERVICALGIA: Primary | ICD-10-CM

## 2023-12-12 PROCEDURE — 97140 MANUAL THERAPY 1/> REGIONS: CPT | Performed by: PHYSICAL THERAPIST

## 2023-12-12 PROCEDURE — 97110 THERAPEUTIC EXERCISES: CPT | Performed by: PHYSICAL THERAPIST

## 2023-12-12 NOTE — PROGRESS NOTES
Daily Note     Today's date: 2023  Patient name: David Simon  : 2005  MRN: [id number]  Referring provider: Sarahi Davis  Dx:   Encounter Diagnosis     ICD-10-CM    1. Cervicalgia  M54.2       2. Paresthesias in right hand  R20.2                      Subjective: Pt reports that she is getting less paresthesias. Her primary complaint is tension in the R UT and deltoid. She does note some muscle type aching sensations in the deltoid and tricep area with some into bicep at times as well. Objective: See treatment diary below      Assessment: Pt tolerates treatment well today with avoidance of paresthesias during exercises. Pt educated to continue avoiding radicular symptom recreation with all movements. Performed manuals as listed. Will monitor pt's response to treatment and continue per tolerance. Plan: Continue per plan of care. Precautions: Multiple joint pain      Manuals           Central gliders (R sideglide with R UE in 50% of nn tension position) DL  MD LEE          Unloaded extension   75% x30"           Manual median nerve glide  x10           PA glides to thoracic  MD AMES          R 1st rib mob  MD LEE          Laser  R cerv. R c-spine 4' R c-spine 4'          Consider pec minor release  MD AMES          Neuro Re-Ed             Thoracic foam roll protocol hugs  Shoulder rolls, hugs, alt arms 20x ea. Median nn.  Floss  HEP                                                                             Ther Ex             Scalene stretch w/ strap HEP 10x10" 3x20"          Pec Minor stretch HEP            Row   Black 3x10          Shoulder Ext   Grn 3x10          Thoracic ext in seated  3x30" 10x10"          UBE (retro)  6'                                     Ther Activity                                       Gait Training                                       Modalities

## 2023-12-14 ENCOUNTER — OFFICE VISIT (OUTPATIENT)
Dept: PHYSICAL THERAPY | Facility: CLINIC | Age: 18
End: 2023-12-14
Payer: COMMERCIAL

## 2023-12-14 DIAGNOSIS — R20.2 PARESTHESIAS IN RIGHT HAND: ICD-10-CM

## 2023-12-14 DIAGNOSIS — M54.2 CERVICALGIA: Primary | ICD-10-CM

## 2023-12-14 PROCEDURE — 97110 THERAPEUTIC EXERCISES: CPT | Performed by: PHYSICAL THERAPIST

## 2023-12-14 PROCEDURE — 97140 MANUAL THERAPY 1/> REGIONS: CPT | Performed by: PHYSICAL THERAPIST

## 2023-12-14 NOTE — PROGRESS NOTES
Daily Note     Today's date: 2023  Patient name: Misa Coronado  : 2005  MRN: [id number]  Referring provider: No ref. provider found  Dx:   Encounter Diagnosis     ICD-10-CM    1. Cervicalgia  M54.2       2. Paresthesias in right hand  R20.2                      Subjective: Pt reports that she did not notice as much of her paresthesias. Notices some "pulling" into the forearm. She more has discomfort in the shoulder today. Objective: See treatment diary below      Assessment: Pt tolerates treatment well today. Educated patient on participating in exercising with avoidance of neural tension. Pt with minimal radicular symptoms today. Pt will benefit from continued skilled PT. She is returning home for winter break and will likely attend PT at home. Plan: Continue per plan of care. Precautions: Multiple joint pain      Manuals          Central gliders (R sideglide with R UE in 50% of nn tension position) DL  MD LEE DL         Unloaded extension   75% x30"           Manual median nerve glide  x10           PA glides to thoracic  MD AMES          R 1st rib mob  MD LEE DL         Laser  R cerv. R c-spine 4' R c-spine 4' R c-spine 4'         Consider pec minor release  MD AMES          Neuro Re-Ed             Thoracic foam roll protocol hugs  Shoulder rolls, hugs, alt arms 20x ea. Shoulder rolls, hugs, alt arms 20x ea. Median nn.  Floss  HEP                                                                             Ther Ex             Scalene stretch w/ strap HEP 10x10" 3x20" 3x20"         Pec Minor stretch HEP            Row   Black 3x10 Black 2x10         High row    Grn 2x10         Shoulder Ext   Grn 3x10 Black 2x10         Chest Fly TB    Blue 2x10 protract scapula         Thoracic ext in seated  3x30" 10x10"          UBE (retro)  6'  1' before onset of paresthesias                                   Ther Activity                                       Gait Training Modalities

## 2023-12-29 LAB — MISCELLANEOUS LAB TEST RESULT: NORMAL

## 2024-03-12 ENCOUNTER — EVALUATION (OUTPATIENT)
Dept: PHYSICAL THERAPY | Facility: CLINIC | Age: 19
End: 2024-03-12
Payer: COMMERCIAL

## 2024-03-12 DIAGNOSIS — M54.41 RIGHT-SIDED LOW BACK PAIN WITH RIGHT-SIDED SCIATICA, UNSPECIFIED CHRONICITY: Primary | ICD-10-CM

## 2024-03-12 PROCEDURE — 97110 THERAPEUTIC EXERCISES: CPT | Performed by: PHYSICAL THERAPIST

## 2024-03-12 PROCEDURE — 97161 PT EVAL LOW COMPLEX 20 MIN: CPT | Performed by: PHYSICAL THERAPIST

## 2024-03-12 NOTE — PROGRESS NOTES
PT Evaluation     Today's date: 3/12/2024  Patient name: Mindy Newman  : 2005  MRN: [id number]  Referring provider: Dev Rivera*  Dx:   Encounter Diagnosis     ICD-10-CM    1. Right-sided low back pain with right-sided sciatica, unspecified chronicity  M54.41                      Assessment  Assessment details: Pt is a 19 y.o. female presenting to PT with chief complaint of paresthesias of the R LE. PT findings include: positive neural tension tests, positive tinels at common peroneal nerve, peripheralization with R max close and centralization with R max open. Pt potentially with lumbar radiculopathy, however as pt with significant history of multiple joint pain and involvement, cannot rule out other components as pain generator. Pt educated on PT findings, anatomy, biomechanics, POC and rehab course. Pt will benefit from skilled PT to address above impairments to return to PLOF with less restriction and to reach functional goals.   Impairments: abnormal muscle tone, abnormal or restricted ROM, impaired physical strength, lacks appropriate home exercise program, pain with function and poor posture     Symptom irritability: lowUnderstanding of Dx/Px/POC: good   Prognosis: fair    Goals  1. Pt will demonstrate understanding of HEP and POC in order to improve compliance with course of rehab in 2 weeks.  2. Pt will demonstrate awareness of appropriate posture with seated, standing and functional dynamic reaching activities in order to decrease excessive loads/pain with ADL's in 3 weeks.   3. Pt's pain will be 2/10 or less to allow pt to return to PLOF with least restriction by d/c.   4. Pt's hip ext MMT will improve to 5/5 in order to decrease lumbar loads with bending/lifting activities by d/c.     Plan  Patient would benefit from: skilled physical therapy  Planned modality interventions: low level laser therapy  Planned therapy interventions: joint mobilization, manual therapy, neuromuscular  re-education, postural training, patient education, stretching, strengthening, therapeutic activities, therapeutic exercise, home exercise program, flexibility, functional ROM exercises and abdominal trunk stabilization  Frequency: 2x week  Duration in weeks: 8  Treatment plan discussed with: patient    Subjective Evaluation    History of Present Illness  Mechanism of injury: Pt arrives to PT via referral from orthopedic surgery for R LE radiculopathy. Pt states in the past 2 months or so, she started to experience parethesias into the L knee and lateral shank into anterior foot. She states with elliptical, she will feel this. She is able to rest and eliminate her symptoms. Pt did have an inversion ankle sprain around time of injury.     Pt does report history of L hip labral tear and MRI in the past with herniation at L4/L5. She did have history of L radiculopathy which resolved with PT.       Quality of life: good    Patient Goals  Patient goals for therapy: decreased edema, decreased pain, increased strength, increased motion and independence with ADLs/IADLs    Pain  Current pain ratin  At worst pain ratin  Location: R foot  Quality: discomfort (tingling)      Objective    Lumbar ROM:  Flex: WNL  Ext: WNL  SB: WNL B (R SB leading to symptom)     Repeated Motions:  Flex: status quo  Ext: status quo  R lat: peripherlization  L lat: centralization    Strength:  TBA    Lumbar mobility:  Hyper L1-L5 CPA    Special tests:  Positive SLR neural tension  Positive slump  Positive tinels common peroneal nn.   Clonus: negative  Babinski: NT               Precautions: None       Manuals 3/12            Prox fibular PA mob NV            Lumbar central gliders W/ 50% R sciatic tension                                      Neuro Re-Ed             Assess innominates  NV            Lumbopelvic screen NV            Isometric abdominal crunch             PPT  W/ hip adduction                                                    Ther Ex             Cat/camel HEP            Tail wag HEP            Usman pose W/ L sidebend HEP            LTR HEP            Lumbar flex Flex and flex to L                                                   Ther Activity                                       Gait Training                                       Modalities

## 2024-03-12 NOTE — LETTER
2024    Dev Rivera DO  [address],  [place] [id number]    Patient: Mindy Newman   YOB: 2005   Date of Visit: 3/12/2024     Encounter Diagnosis     ICD-10-CM    1. Right-sided low back pain with right-sided sciatica, unspecified chronicity  M54.41           Dear Dr. Rivera:    Thank you for your recent referral of Mindy Newman. Please review the attached evaluation summary from Mindy's recent visit.     Please verify that you agree with the plan of care by signing the attached order.     If you have any questions or concerns, please do not hesitate to call.     I sincerely appreciate the opportunity to share in the care of one of your patients and hope to have another opportunity to work with you in the near future.       Sincerely,    Antolin Webb, PT      Referring Provider:      I certify that I have read the below Plan of Care and certify the need for these services furnished under this plan of treatment while under my care.                    Dev Rivera DO  [address],  [address] [address]  Via Fax: [phone]          PT Evaluation     Today's date: 3/12/2024  Patient name: Mindy Newman  : 2005  MRN: [id number]  Referring provider: Dev Rivera*  Dx:   Encounter Diagnosis     ICD-10-CM    1. Right-sided low back pain with right-sided sciatica, unspecified chronicity  M54.41                      Assessment  Assessment details: Pt is a 19 y.o. female presenting to PT with chief complaint of paresthesias of the R LE. PT findings include: positive neural tension tests, positive tinels at common peroneal nerve, peripheralization with R max close and centralization with R max open. Pt potentially with lumbar radiculopathy, however as pt with significant history of multiple joint pain and involvement, cannot rule out other components as pain generator. Pt educated on PT  findings, anatomy, biomechanics, POC and rehab course. Pt will benefit from skilled PT to address above impairments to return to PLOF with less restriction and to reach functional goals.   Impairments: abnormal muscle tone, abnormal or restricted ROM, impaired physical strength, lacks appropriate home exercise program, pain with function and poor posture     Symptom irritability: lowUnderstanding of Dx/Px/POC: good   Prognosis: fair    Goals  1. Pt will demonstrate understanding of HEP and POC in order to improve compliance with course of rehab in 2 weeks.  2. Pt will demonstrate awareness of appropriate posture with seated, standing and functional dynamic reaching activities in order to decrease excessive loads/pain with ADL's in 3 weeks.   3. Pt's pain will be 2/10 or less to allow pt to return to PLOF with least restriction by d/c.   4. Pt's hip ext MMT will improve to 5/5 in order to decrease lumbar loads with bending/lifting activities by d/c.     Plan  Patient would benefit from: skilled physical therapy  Planned modality interventions: low level laser therapy  Planned therapy interventions: joint mobilization, manual therapy, neuromuscular re-education, postural training, patient education, stretching, strengthening, therapeutic activities, therapeutic exercise, home exercise program, flexibility, functional ROM exercises and abdominal trunk stabilization  Frequency: 2x week  Duration in weeks: 8  Treatment plan discussed with: patient    Subjective Evaluation    History of Present Illness  Mechanism of injury: Pt arrives to PT via referral from orthopedic surgery for R LE radiculopathy. Pt states in the past 2 months or so, she started to experience parethesias into the L knee and lateral shank into anterior foot. She states with elliptical, she will feel this. She is able to rest and eliminate her symptoms. Pt did have an inversion ankle sprain around time of injury.     Pt does report history of L hip  labral tear and MRI in the past with herniation at L4/L5. She did have history of L radiculopathy which resolved with PT.       Quality of life: good    Patient Goals  Patient goals for therapy: decreased edema, decreased pain, increased strength, increased motion and independence with ADLs/IADLs    Pain  Current pain ratin  At worst pain ratin  Location: R foot  Quality: discomfort (tingling)      Objective    Lumbar ROM:  Flex: WNL  Ext: WNL  SB: WNL B (R SB leading to symptom)     Repeated Motions:  Flex: status quo  Ext: status quo  R lat: peripherlization  L lat: centralization    Strength:  TBA    Lumbar mobility:  Hyper L1-L5 CPA    Special tests:  Positive SLR neural tension  Positive slump  Positive tinels common peroneal nn.   Clonus: negative  Babinski: NT               Precautions: None       Manuals 3/12            Prox fibular PA mob NV            Lumbar central gliders W/ 50% R sciatic tension                                      Neuro Re-Ed             Assess innominates  NV            Lumbopelvic screen NV            Isometric abdominal crunch             PPT  W/ hip adduction                                                   Ther Ex             Cat/camel HEP            Tail wag HEP            Usman pose W/ L sidebend HEP            LTR HEP            Lumbar flex Flex and flex to L                                                   Ther Activity                                       Gait Training                                       Modalities

## 2024-03-14 ENCOUNTER — CONSULT (OUTPATIENT)
Dept: RHEUMATOLOGY | Facility: CLINIC | Age: 19
End: 2024-03-14
Payer: COMMERCIAL

## 2024-03-14 ENCOUNTER — APPOINTMENT (OUTPATIENT)
Dept: LAB | Facility: CLINIC | Age: 19
End: 2024-03-14
Payer: COMMERCIAL

## 2024-03-14 VITALS
OXYGEN SATURATION: 99 % | DIASTOLIC BLOOD PRESSURE: 52 MMHG | SYSTOLIC BLOOD PRESSURE: 98 MMHG | HEART RATE: 74 BPM | WEIGHT: 147 LBS | HEIGHT: 67 IN | BODY MASS INDEX: 23.07 KG/M2

## 2024-03-14 DIAGNOSIS — M79.10 MYALGIA: ICD-10-CM

## 2024-03-14 DIAGNOSIS — M25.50 ARTHRALGIA, UNSPECIFIED JOINT: ICD-10-CM

## 2024-03-14 PROCEDURE — 99205 OFFICE O/P NEW HI 60 MIN: CPT | Performed by: INTERNAL MEDICINE

## 2024-03-14 PROCEDURE — 86200 CCP ANTIBODY: CPT

## 2024-03-14 PROCEDURE — 36415 COLL VENOUS BLD VENIPUNCTURE: CPT

## 2024-03-14 RX ORDER — CELECOXIB 200 MG/1
200 CAPSULE ORAL 2 TIMES DAILY WITH MEALS
COMMUNITY
Start: 2024-01-08

## 2024-03-14 RX ORDER — DULOXETIN HYDROCHLORIDE 30 MG/1
CAPSULE, DELAYED RELEASE ORAL
COMMUNITY
Start: 2024-01-08

## 2024-03-14 RX ORDER — ORPHENADRINE CITRATE 100 MG/1
TABLET, EXTENDED RELEASE ORAL
COMMUNITY
Start: 2024-03-07

## 2024-03-14 RX ORDER — ELECTROLYTES/DEXTROSE
SOLUTION, ORAL ORAL
COMMUNITY
End: 2024-03-14

## 2024-03-14 RX ORDER — SPIRONOLACTONE 50 MG/1
TABLET, FILM COATED ORAL
COMMUNITY
Start: 2024-03-06

## 2024-03-14 RX ORDER — BIOTIN 10 MG
TABLET ORAL 2 TIMES DAILY
COMMUNITY

## 2024-03-14 NOTE — PROGRESS NOTES
"  This is a Rheumatology Consult seen at the request of Dr. Davenport      HPI: Mindy Newman is a 20 y/o female who presents for further evaluation chronic arthralgias, myalgias, \"nerve pain\". She has past medical history labral tear left hip, bulging lumbar disc    H/o chronic back pain > 2 years duration and chronic hip pain > 5 years duration. She follows  with Orthopedic Specialist in [place] and now with [facility].     Was dx labral tear and bulging discs    She was recommended Physical therapy per orthopedic team at [facility]. She was also Rxd Cymbalta and Celebrex (currently not taking)     Other than back and hip she describes \"nerve pain\" RUE    Weakness and pain in her wrists associated with typing/writing    \"Sharp knife like pain in thoracic spine\"    +pins and needles right leg    + left hip pain      W/u including JOSIE, RF, ESR, CRP, RNP, cardiolipin ab checked and negative.            --------------------------------------------------------------------------------------------------------        ROS:        - for: Fevers, Chills or sweats.  No HAs or scalp tenderness.  No jaw claudication.  No acute visual or eye changes.  No dry eyes.  No auditory complaints.  No oral lesions or ulcers.  No dry mouth.  No sore throat or cough.  No chest pains or palpitations.  No shortness of breath, dyspnea on exertion or wheezing.  No hemotpysis.  No abdominal pain, GERD symptoms, diarrhea or constipation.  No urinary complaints.  No numbness, tingling or weakness.  No rashes.    All other ROS was reviewed and negative except as above         --------------------------------------------------------------------------------------------------------    Past Medical History    Past Medical History:   Diagnosis Date    Hypothyroidism            Past Surgical History    Past Surgical History:   Procedure Laterality Date    WISDOM TOOTH EXTRACTION             Family History    Mother fibromyalgia         Social " History    Social History     Tobacco Use    Smoking status: Never    Smokeless tobacco: Never   Vaping Use    Vaping status: Never Used   Substance Use Topics    Alcohol use: Yes     Comment: social    Drug use: Never     Desales 3+2 PA program     Allergies    Allergies   Allergen Reactions    Fish Allergy - Food Allergy Itching, Swelling, Throat Swelling and Tongue Swelling    Shellfish Allergy - Food Allergy Cough, Itching, Swelling, Throat Swelling and Tongue Swelling         Medications    Current Outpatient Medications   Medication Instructions    Adapalene-Benzoyl Peroxide 0.3-2.5 % GEL APPLY AT BEDTIME TO FACE    niacinamide 500 mg tablet       Spironolactone  MVI    Physical Exam    There were no vitals taken for this visit.    GEN: AAO, No apparent distress.  Patient is well developed.  HEENT:  Pupils are equal, round and reactive.  Sclera are clear.  Fundoscopic exam is normal.  External ears are without lesions.  Oral pharynx is clear of ulcers or other lesions.  MMM.   NECK:  Supple.  There is no adenopathy appreciable in anterior or posterior cervical chains or supraclavicularly.  JVP is normal.    HEART: Regular rate and rhythm.  There is no appreciable murmur, gallop or rub.  LUNGS: Clear to auscultation.  ABD:  Soft, without tenderness, rebound or guarding.  No appreciable organomegally.  NEURO: Speech and cognition are normal.  Strength is 5/5 throughout.  Tone is normal.  DTRs are 2/4 at the knees, ankles and elbows.  Gait is normal.  SKIN: There are no rashes or lesions    MUSCULOSKELETAL:   -Hands: normal  -Wrists: normal  -Elbows: normal  -Shoulders: normal  -Neck: normal  -Back: normal  -Hips: normal  -Knees: normal  -Ankles: normal  -Feet: normal      ________________________________________________________________________          Results Review    Component      Latest Ref Rng 4/26/2023   ANTICARDIOLIPIN IGG ANTIBODY      See comment  0.9    ANTICARDIOLIPIN IGA ANTIBODY      See comment   1.4    ANTICARDIOLIPIN IGM ANTIBODY      See comment  2.0    JOSIE SCREEN      Negative  Negative    RHEUMATOID FACTOR      Negative  Negative    HEP C AB      Non-Reactive  Non-reactive    Sed Rate      0 - 19 mm/hour 5    C-REACTIVE PROTEIN      <3.0 mg/L <3.0        XR Hips & Pelvis 2VW AP & Frog Lat (Bilat)  Order: [id number]  Impression    Unremarkable bilateral hip and pelvis radiographs.    Please call parent and inform xray normal  NC          Study Result    Narrative & Impression   THORACIC SPINE     INDICATION:   M25.511: Pain in right shoulder.     COMPARISON:  None     VIEWS:  XR SPINE THORACIC 3 VW        FINDINGS:     There is no fracture or pathologic bone lesion.     Thoracic vertebral alignment is within normal limits.     No significant degenerative changes.     There is no displacement of the paraspinal line.     The pedicles appear intact.     IMPRESSION:     No acute osseous abnormality.            Impressions and Plans:    Mindy Newman is a 20 y/o female with with chronic back and hip pain,follows with [facility] and dx with labral tear.     She has had MRI  left hip, lumbar spine and brain at [facility]. She will email me the reports    W/u including JOSIE, RF, ESR, CRP, RNP, cardiolipin ab checked and negative.    On exam no swollen or tender joints, synovitis, effusion,tender points of fibromyalgia    ?neuropathy: Recommend neurology evaluation per discretion primary    Check CCP ab    Will review test results and f/u as needed      Thank you for involving me in this patient's care.        Gil Koch MD  [facility] Rheumatology    I have spent a total time of 63 minutes on 03/14/24 in caring for this patient including Diagnostic results, Impressions, Counseling / Coordination of care, Documenting in the medical record, Reviewing / ordering tests, medicine, procedures  , and Obtaining or reviewing history  .

## 2024-03-15 LAB — CCP AB SER IA-ACNC: <0.4

## 2024-03-19 ENCOUNTER — OFFICE VISIT (OUTPATIENT)
Dept: PHYSICAL THERAPY | Facility: CLINIC | Age: 19
End: 2024-03-19
Payer: COMMERCIAL

## 2024-03-19 DIAGNOSIS — M54.41 RIGHT-SIDED LOW BACK PAIN WITH RIGHT-SIDED SCIATICA, UNSPECIFIED CHRONICITY: Primary | ICD-10-CM

## 2024-03-19 PROCEDURE — 97112 NEUROMUSCULAR REEDUCATION: CPT | Performed by: PHYSICAL THERAPIST

## 2024-03-19 PROCEDURE — 97110 THERAPEUTIC EXERCISES: CPT | Performed by: PHYSICAL THERAPIST

## 2024-03-19 PROCEDURE — 97140 MANUAL THERAPY 1/> REGIONS: CPT | Performed by: PHYSICAL THERAPIST

## 2024-03-19 NOTE — PROGRESS NOTES
"Daily Note     Today's date: 3/19/2024  Patient name: Mindy Newman  : 2005  MRN: [id number]  Referring provider: Dev Rivera*  Dx:   Encounter Diagnosis     ICD-10-CM    1. Right-sided low back pain with right-sided sciatica, unspecified chronicity  M54.41                      Subjective: Pt reports no significant changes since last visit. She reports that it has remained very sensitive. She states with sitting and light pressure to the  anterior/lateral aspect of knee will feel tingling into the anterior/lateral shin area.       Objective: See treatment diary below      Assessment: Initiated gentle lumbar mobility exercises attempting to avoid radicular symptoms. Trialed breaking bread mobilizations with mild neural tension which was tolerated for the most part but does increase radicular symptoms. Performed laser in the common peroneal nerve area to conclude session which relieved her symptoms into the shin therefore will likely increase duration and address more of superficial/deep peroneal nerve pathway.       Plan: Continue per plan of care.      Precautions: None       Manuals 3/12 3/19           Prox fibular PA mob NV DL           Lumbar central gliders W/ 50% R sciatic tension DL            Laser  R common peroneal nn 4' 10W                        Neuro Re-Ed             Assess innominates  NV WNL           Lumbopelvic screen NV WNL           Isometric abdominal crunch             PPT  W/ hip adduction                                                   Ther Ex             Cat/camel HEP            Tail wag HEP            Usman pose W/ L sidebend HEP 20\"x3           LTR HEP 10x5\"           Lumbar flex Flex and flex to L            Pt education  Discussed avoiding peripheralization, exercise, mobility                                     Ther Activity                                       Gait Training                                       Modalities                                          "

## 2024-03-26 ENCOUNTER — OFFICE VISIT (OUTPATIENT)
Dept: PHYSICAL THERAPY | Facility: CLINIC | Age: 19
End: 2024-03-26
Payer: COMMERCIAL

## 2024-03-26 DIAGNOSIS — M54.41 RIGHT-SIDED LOW BACK PAIN WITH RIGHT-SIDED SCIATICA, UNSPECIFIED CHRONICITY: Primary | ICD-10-CM

## 2024-03-26 PROCEDURE — 97140 MANUAL THERAPY 1/> REGIONS: CPT | Performed by: PHYSICAL THERAPIST

## 2024-03-26 PROCEDURE — 97110 THERAPEUTIC EXERCISES: CPT | Performed by: PHYSICAL THERAPIST

## 2024-03-26 PROCEDURE — 97112 NEUROMUSCULAR REEDUCATION: CPT | Performed by: PHYSICAL THERAPIST

## 2024-03-26 NOTE — PROGRESS NOTES
"Daily Note     Today's date: 3/26/2024  Patient name: Mindy Newman  : 2005  MRN: [id number]  Referring provider: Dev Rivera*  Dx:   Encounter Diagnosis     ICD-10-CM    1. Right-sided low back pain with right-sided sciatica, unspecified chronicity  M54.41                      Subjective: Pt reports no significant changes since last visit. Continuing with paresthesias into the leg.       Objective: See treatment diary below      Assessment: As pt with no significant changes with current POC, re-assessed patient. Pt, on her own, performs static stretching regularly, stating that she feels tight. However, upon further testing, she is positive on many items on beighton scale for hypermobility. Advised pt to hold on stretching and initiated stabilization program. Concluded with laser; PT will monitor pt's response and continue POC per presentation.       Plan: Continue per plan of care.      Precautions: None       Manuals 3/12 3/19 3/26          Prox fibular PA mob NV DL           Lumbar central gliders W/ 50% R sciatic tension DL            Laser  R common peroneal nn 4' 10W R common peroneal nn 4' 10W                       Neuro Re-Ed             Assess innominates  NV WNL           Lumbopelvic screen NV WNL           Isometric abdominal crunch             PPT  W/ hip adduction            bridge   15x 5\"          Bridge HS curl   Pball 2x10          Sidelying oblique lift (mermaid)   NV          Isometric abdominal crunch   Pball 15x5\"; 10x5\" wand                       Ther Ex             Cat/camel HEP            Tail wag HEP            Usman pose W/ L sidebend HEP 20\"x3           LTR HEP 10x5\"           Lumbar flex Flex and flex to L            Pt education  Discussed avoiding peripheralization, exercise, mobility           Hip abduction   2x10 ea          Clamshell   Red 2x10 ea.           Ankle DF/EV/INV    Blue 20x ea.                                    Ther Activity                        "                Gait Training                                       Modalities

## 2024-04-02 ENCOUNTER — OFFICE VISIT (OUTPATIENT)
Dept: PHYSICAL THERAPY | Facility: CLINIC | Age: 19
End: 2024-04-02
Payer: COMMERCIAL

## 2024-04-02 DIAGNOSIS — M54.41 RIGHT-SIDED LOW BACK PAIN WITH RIGHT-SIDED SCIATICA, UNSPECIFIED CHRONICITY: Primary | ICD-10-CM

## 2024-04-02 PROCEDURE — 97110 THERAPEUTIC EXERCISES: CPT | Performed by: PHYSICAL THERAPIST

## 2024-04-02 PROCEDURE — 97112 NEUROMUSCULAR REEDUCATION: CPT | Performed by: PHYSICAL THERAPIST

## 2024-04-02 PROCEDURE — 97140 MANUAL THERAPY 1/> REGIONS: CPT | Performed by: PHYSICAL THERAPIST

## 2024-04-02 NOTE — PROGRESS NOTES
"Daily Note     Today's date: 2024  Patient name: Mindy Newman  : 2005  MRN: [id number]  Referring provider: Dev Rivera*  Dx:   Encounter Diagnosis     ICD-10-CM    1. Right-sided low back pain with right-sided sciatica, unspecified chronicity  M54.41                      Subjective: Pt reports holding from performing her stretches for the past week. She feels more \"achy and tight\", potentially slight improvements noted with nerve type symptoms. She does report that doing the elliptical recreates her symptoms.       Objective: See treatment diary below      Assessment: Pt continues with some of her paresthesias. Upon further questioning, pt does have custom orthotics prescribed and custom made by podiatrist for high arches. Pt with common peroneal nerve irritability and sciatic nerve irritability; secondary to location of common peroneal nerve and foot paresthesias, assessed foot. Pt with minimal calcaneal eversion. Trialed lateral lunges pre tape and post fibular PA taping; pt with improvement post. Pt concluded with trialing elliptical (which pt reports brings on her pain), and pt reports improvement with taping and removal of orthotics. Pt educated to continue without orthotics for next week. Pt will benefit from continued skilled PT.       Plan: Continue per plan of care.      Precautions: None       Manuals 3/12 3/19 3/26 4/2         Prox fibular PA mob NV DL           Lumbar central gliders W/ 50% R sciatic tension DL            Laser  R common peroneal nn 4' 10W R common peroneal nn 4' 10W          Post fib taping    DL leukotape         Neuro Re-Ed             Assess innominates  NV WNL           Lumbopelvic screen NV WNL           Isometric abdominal crunch             PPT  W/ hip adduction   15x5\"         bridge   15x 5\"          Bridge HS curl   Pball 2x10 Pball trialed, foot paresthesias         Sidelying oblique lift (mermaid)   NV 10x5\" ea.          Isometric abdominal crunch   " "Pball 15x5\"; 10x5\" wand 15x5\" wand         Tests/measures    See assess.         Ther Ex             Cat/camel HEP            Tail wag HEP            Usman pose W/ L sidebend HEP 20\"x3           LTR HEP 10x5\"           Lumbar flex Flex and flex to L            Pt education  Discussed avoiding peripheralization, exercise, mobility           Hip abduction   2x10 ea          Clamshell   Red 2x10 ea.           Ankle DF/EV/INV    Blue 20x ea.          Sciatic nerve glides    Floss 10x ea LE         Elliptical     10'         Ther Activity                                       Gait Training                                       Modalities                                                "

## 2024-04-09 ENCOUNTER — OFFICE VISIT (OUTPATIENT)
Dept: PHYSICAL THERAPY | Facility: CLINIC | Age: 19
End: 2024-04-09
Payer: COMMERCIAL

## 2024-04-09 ENCOUNTER — TELEPHONE (OUTPATIENT)
Dept: OTHER | Facility: HOSPITAL | Age: 19
End: 2024-04-09

## 2024-04-09 DIAGNOSIS — M54.41 RIGHT-SIDED LOW BACK PAIN WITH RIGHT-SIDED SCIATICA, UNSPECIFIED CHRONICITY: Primary | ICD-10-CM

## 2024-04-09 PROCEDURE — 97140 MANUAL THERAPY 1/> REGIONS: CPT | Performed by: PHYSICAL THERAPIST

## 2024-04-09 PROCEDURE — 97110 THERAPEUTIC EXERCISES: CPT | Performed by: PHYSICAL THERAPIST

## 2024-04-09 PROCEDURE — 97112 NEUROMUSCULAR REEDUCATION: CPT | Performed by: PHYSICAL THERAPIST

## 2024-04-09 NOTE — TELEPHONE ENCOUNTER
Received call from patient t to inform provider that she has an exam today 3:30-4:15 PM and would not be available to answer call post My Chart message.

## 2024-04-09 NOTE — PROGRESS NOTES
"Daily Note     Today's date: 2024  Patient name: Mindy Newman  : 2005  MRN: [id number]  Referring provider: Dev Rivera*  Dx:   Encounter Diagnosis     ICD-10-CM    1. Right-sided low back pain with right-sided sciatica, unspecified chronicity  M54.41                      Subjective: Pt reports a difference since last visit without use of orthotics and adjusting sitting position. Pt states that she has not had any \"flare ups\" in the R LE since last session.       Objective: See treatment diary below      Assessment: Performed calcaneal eversion/lat glide mobilizations today. Continued with leukotape as pt with good response to this. Continued also with core strengthening with minimal onset of paresthesias which is an improvement since last PT session. Pt will benefit from continued skilled PT.       Plan: Continue per plan of care.      Precautions: None       Manuals 3/12 3/19 3/26 4/2 4/9        Prox fibular AP mob NV DL           Lumbar central gliders W/ 50% R sciatic tension DL            Laser  R common peroneal nn 4' 10W R common peroneal nn 4' 10W          Calcaneal eversion/lat glide     DL Gr III-IV        Post fib taping    DL leukotape DL leukotape        Neuro Re-Ed             Assess innominates  NV WNL           Lumbopelvic screen NV WNL           Isometric abdominal crunch             PPT  W/ hip adduction   15x5\" 15x5\"        bridge   15x 5\"          Bridge HS curl   Pball 2x10 Pball trialed, foot paresthesias         Sidelying oblique lift (mermaid)   NV 10x5\" ea.  10x5\"  ea.         Isometric abdominal crunch   Pball 15x5\"; 10x5\" wand 15x5\" wand 15x5\" wand        Tests/measures    See assess.         Ther Ex             Cat/camel HEP            Tail wag HEP            Usman pose W/ L sidebend HEP 20\"x3           LTR HEP 10x5\"           Lumbar flex Flex and flex to L            Pt education  Discussed avoiding peripheralization, exercise, mobility           Hip abduction   " 2x10 ea          Clamshell   Red 2x10 ea.           Ankle DF/EV/INV    Blue 20x ea.  Blue 20x ea.         Sciatic nerve glides    Floss 10x ea LE Floss 10x ea LE        Elliptical     10'         Ther Activity                                       Gait Training                                       Modalities

## 2024-04-12 DIAGNOSIS — M25.562 CHRONIC ARTHRALGIAS OF KNEES AND HIPS: Primary | ICD-10-CM

## 2024-04-12 DIAGNOSIS — G89.29 CHRONIC ARTHRALGIAS OF KNEES AND HIPS: Primary | ICD-10-CM

## 2024-04-12 DIAGNOSIS — M25.561 CHRONIC ARTHRALGIAS OF KNEES AND HIPS: Primary | ICD-10-CM

## 2024-04-12 DIAGNOSIS — M54.50 CHRONIC LOW BACK PAIN, UNSPECIFIED BACK PAIN LATERALITY, UNSPECIFIED WHETHER SCIATICA PRESENT: ICD-10-CM

## 2024-04-12 DIAGNOSIS — M25.552 CHRONIC ARTHRALGIAS OF KNEES AND HIPS: Primary | ICD-10-CM

## 2024-04-12 DIAGNOSIS — M25.551 CHRONIC ARTHRALGIAS OF KNEES AND HIPS: Primary | ICD-10-CM

## 2024-04-12 DIAGNOSIS — G89.29 CHRONIC LOW BACK PAIN, UNSPECIFIED BACK PAIN LATERALITY, UNSPECIFIED WHETHER SCIATICA PRESENT: ICD-10-CM

## 2024-04-16 ENCOUNTER — APPOINTMENT (OUTPATIENT)
Dept: PHYSICAL THERAPY | Facility: CLINIC | Age: 19
End: 2024-04-16
Payer: COMMERCIAL

## 2024-04-18 ENCOUNTER — OFFICE VISIT (OUTPATIENT)
Dept: PHYSICAL THERAPY | Facility: CLINIC | Age: 19
End: 2024-04-18
Payer: COMMERCIAL

## 2024-04-18 DIAGNOSIS — M54.41 RIGHT-SIDED LOW BACK PAIN WITH RIGHT-SIDED SCIATICA, UNSPECIFIED CHRONICITY: Primary | ICD-10-CM

## 2024-04-18 PROCEDURE — 97110 THERAPEUTIC EXERCISES: CPT | Performed by: PHYSICAL THERAPIST

## 2024-04-18 PROCEDURE — 97140 MANUAL THERAPY 1/> REGIONS: CPT | Performed by: PHYSICAL THERAPIST

## 2024-04-18 NOTE — PROGRESS NOTES
"Daily Note     Today's date: 2024  Patient name: Mindy Newman  : 2005  MRN: [id number]  Referring provider: Dev Rivera*  Dx:   Encounter Diagnosis     ICD-10-CM    1. Right-sided low back pain with right-sided sciatica, unspecified chronicity  M54.41                      Subjective: Pt reports that her symptoms have been better controlled. She has not had many of her \"flare ups\".       Objective: See treatment diary below      Assessment: Pt with minimal symptoms and decreased sensitivity with activities performed today. Performed ankle stabilization exercises. Pt with some R knee issues potentially involved in radicular symptoms, therefore will assess NV.       Plan: Continue per plan of care.      Precautions: None       Manuals 3/12 3/19 3/26 4/2 4/9 4/18       Prox fibular AP mob NV DL           Lumbar central gliders W/ 50% R sciatic tension DL            Laser  R common peroneal nn 4' 10W R common peroneal nn 4' 10W   R common peroneal 4' 10W, tarsal tunnel       Calcaneal eversion/lat glide     DL Gr III-IV DL Gr III-IV       Post fib taping    DL leukotape DL leukotape DL leukotape       Neuro Re-Ed             Assess innominates  NV WNL           Lumbopelvic screen NV WNL           Isometric abdominal crunch             PPT  W/ hip adduction   15x5\" 15x5\"        bridge   15x 5\"          Bridge HS curl   Pball 2x10 Pball trialed, foot paresthesias         Sidelying oblique lift (mermaid)   NV 10x5\" ea.  10x5\"  ea.         Isometric abdominal crunch   Pball 15x5\"; 10x5\" wand 15x5\" wand 15x5\" wand        Tests/measures    See assess.         Ther Ex             Cat/camel HEP            Tail wag HEP            Usman pose W/ L sidebend HEP 20\"x3           LTR HEP 10x5\"           Lumbar flex Flex and flex to L            Pt education  Discussed avoiding peripheralization, exercise, mobility           Hip abduction   2x10 ea          Clamshell   Red 2x10 ea.           Standing hip " abduction w/ tband midfoot      Red 2x10 ea.        Ankle DF/EV/INV    Blue 20x ea.  Blue 20x ea.  Blue 20x ea       Sciatic nerve glides    Floss 10x ea LE Floss 10x ea LE 10x ea LE       U/l heel rise      No UE support 2x10 ea.        Elliptical     10'         Ther Activity                                       Gait Training                                       Modalities

## 2024-04-23 ENCOUNTER — OFFICE VISIT (OUTPATIENT)
Dept: PHYSICAL THERAPY | Facility: CLINIC | Age: 19
End: 2024-04-23
Payer: COMMERCIAL

## 2024-04-23 DIAGNOSIS — M54.41 RIGHT-SIDED LOW BACK PAIN WITH RIGHT-SIDED SCIATICA, UNSPECIFIED CHRONICITY: Primary | ICD-10-CM

## 2024-04-23 PROCEDURE — 97110 THERAPEUTIC EXERCISES: CPT | Performed by: PHYSICAL THERAPIST

## 2024-04-23 NOTE — PROGRESS NOTES
"Daily Note     Today's date: 2024  Patient name: Mindy Newman  : 2005  MRN: [id number]  Referring provider: Dev Rivera*  Dx:   Encounter Diagnosis     ICD-10-CM    1. Right-sided low back pain with right-sided sciatica, unspecified chronicity  M54.41                      Subjective: Pt reports that she has not experienced many \"flares\" since last visit. She does feel some nerve type symptoms when doing squatting activities which she would like assessed.       Objective: See treatment diary below      Assessment: Pt tolerates treatment well today without significant complaints. With use of medial glide leukotape, pt with decreased symptoms. Also with correcting for lack of pronation during exercises, pt with improvement in nerve type symptoms. Pt with improving trends therefore pt to be discharged from PT. PT answered all questions/concerns.       Plan: Continue per plan of care.      Precautions: None       Manuals 3/12 3/19 3/26 4/2 4/9 4/18 4/23      Prox fibular AP mob NV DL           Lumbar central gliders W/ 50% R sciatic tension DL            Laser  R common peroneal nn 4' 10W R common peroneal nn 4' 10W   R common peroneal 4' 10W, tarsal tunnel       Calcaneal eversion/lat glide     DL Gr III-IV DL Gr III-IV       Post fib taping    DL leukotape DL leukotape DL leukotape       Medial glide patellar leukotape.        DL      Neuro Re-Ed             Assess innominates  NV WNL           Lumbopelvic screen NV WNL           Isometric abdominal crunch             PPT  W/ hip adduction   15x5\" 15x5\"        bridge   15x 5\"          Bridge HS curl   Pball 2x10 Pball trialed, foot paresthesias         Sidelying oblique lift (mermaid)   NV 10x5\" ea.  10x5\"  ea.         Isometric abdominal crunch   Pball 15x5\"; 10x5\" wand 15x5\" wand 15x5\" wand        Tests/measures    See assess.   See assess.      Ther Ex             Cat/camel HEP            Tail wag HEP            Usman pose W/ L sidebend " "HEP 20\"x3           LTR HEP 10x5\"           Lumbar flex Flex and flex to L            Pt education  Discussed avoiding peripheralization, exercise, mobility           Hip abduction   2x10 ea          Clamshell   Red 2x10 ea.           Standing hip abduction w/ tband midfoot      Red 2x10 ea.        Ankle DF/EV/INV    Blue 20x ea.  Blue 20x ea.  Blue 20x ea       Sciatic nerve glides    Floss 10x ea LE Floss 10x ea LE 10x ea LE       U/l heel rise      No UE support 2x10 ea.        Elliptical     10'         Ther Activity                                       Gait Training                                       Modalities                                                      "

## 2024-04-29 ENCOUNTER — TELEPHONE (OUTPATIENT)
Age: 19
End: 2024-04-29

## 2024-04-29 DIAGNOSIS — G89.29 CHRONIC LOW BACK PAIN, UNSPECIFIED BACK PAIN LATERALITY, UNSPECIFIED WHETHER SCIATICA PRESENT: Primary | ICD-10-CM

## 2024-04-29 DIAGNOSIS — M54.50 CHRONIC LOW BACK PAIN, UNSPECIFIED BACK PAIN LATERALITY, UNSPECIFIED WHETHER SCIATICA PRESENT: Primary | ICD-10-CM

## 2024-04-29 NOTE — TELEPHONE ENCOUNTER
[facility] called - she wanted a Plan of Care lower back faxed to her at [phone].     Please call them if need more info [phone]

## 2024-06-18 ENCOUNTER — TELEMEDICINE (OUTPATIENT)
Age: 19
End: 2024-06-18
Payer: COMMERCIAL

## 2024-06-18 DIAGNOSIS — Z11.1 SCREENING FOR TUBERCULOSIS: ICD-10-CM

## 2024-06-18 DIAGNOSIS — E03.9 HYPOTHYROIDISM, UNSPECIFIED TYPE: ICD-10-CM

## 2024-06-18 DIAGNOSIS — G89.29 OTHER CHRONIC PAIN: Primary | ICD-10-CM

## 2024-06-18 PROCEDURE — 99213 OFFICE O/P EST LOW 20 MIN: CPT

## 2024-06-18 NOTE — PROGRESS NOTES
Virtual Regular Visit    Verification of patient location:    Patient is located at Home in the following state in which I hold an active license [place]    Assessment/Plan:    Problem List Items Addressed This Visit          Endocrine    Hypothyroidism     Diagnosed in the past; trial of supplements was discontinued d/t adverse reactions.  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  History of labral tear of the hip and recent history of thoracic outlet syndrome  Autoimmune workup and rheumatology evaluation were unremarkable for concerning findings  Follows with Ortho-recently started on Cymbalta and referred to PT; considering surgery for labral tear of hip  Patient referred to neurology for continued workup; appointment scheduled for August          Other Visit Diagnoses       Screening for tuberculosis        Required for school as part of annual physical    Relevant Orders    Quantiferon TB Gold Plus Assay           Patient is overdue for annual physical. RTO within 1-2 months.     Reason for visit is   Chief Complaint   Patient presents with    Virtual Regular Visit     Encounter provider Kaelyn Robles MD    Recent Visits  No visits were found meeting these conditions.  Showing recent visits within past 7 days and meeting all other requirements  Future Appointments  No visits were found meeting these conditions.  Showing future appointments within next 150 days and meeting all other requirements       The patient was identified by name and date of birth. Mindytequila Newman was informed that this is a telemedicine visit and that the visit is being conducted through the Epic Embedded platform. She agrees to proceed..  My office door was closed. No one else was in  the room.  She acknowledged consent and understanding of privacy and security of the video platform. The patient has agreed to participate and understands they can discontinue the visit at any time.    Patient is aware this is a billable service.     aCrlos Newman is a 19 y.o. female  HPI   Patient presents for a follow-up visit for continued management of multiple  ongoing and chronic concerns.   Manage patient has a history of multiple orthopedic/chronic pain complaints and hip, back and shoulders for which she has been following with orthopedic and rheumatology.  They recommended that she follow-up with neurology for further workup of her symptoms.    The patient does have a history of a labral tear in her hip, likely contributing to her chronic hip pain.  She was started on Cymbalta and referred to physical therapy by her orthopedic specialist.  They are considering surgery but warned patient of 4-week recovery period requirement.  Patient reports recent diagnosis of thoracic outlet syndrome found on ultrasound after symptom onset of tingling/weakness in her right arm.  She has been going to physical therapy for this and reports gradual improvement.  For her acne, the patient was recently started on Accutane approximately 3 weeks ago.  She is not currently on any mode of birth control but endorses abstinence at this time.  Patient is aware of medication toxicity to fetus warning.  Patient endorses improvement of anxiety related symptoms after finishing her current school year. She also notes some improvement after starting Cymbalta for pain symptoms.      Past Medical History:   Diagnosis Date    Hypothyroidism        Past Surgical History:   Procedure Laterality Date    WISDOM TOOTH EXTRACTION         Current Outpatient Medications   Medication Sig Dispense Refill    DULoxetine (CYMBALTA) 30 mg delayed release capsule       Multiple Vitamins-Minerals (Multivitamin/Extra Vitamin D3) CHEW Chew 2 (two)  times a day       No current facility-administered medications for this visit.        Allergies   Allergen Reactions    Fish Allergy - Food Allergy Itching, Swelling, Throat Swelling and Tongue Swelling    Shellfish Allergy - Food Allergy Cough, Itching, Swelling, Throat Swelling and Tongue Swelling       Review of Systems   Constitutional:  Negative for chills and fever.   HENT:  Negative for congestion and rhinorrhea.    Eyes:  Negative for visual disturbance.   Respiratory:  Negative for shortness of breath.    Cardiovascular:  Negative for chest pain and palpitations.   Gastrointestinal:  Negative for abdominal pain, nausea and vomiting.   Endocrine: Negative for cold intolerance and heat intolerance.   Musculoskeletal:  Positive for arthralgias and myalgias.   Skin:         acne   Neurological:  Positive for numbness. Negative for dizziness, light-headedness and headaches.   Psychiatric/Behavioral:  Negative for confusion, dysphoric mood, hallucinations, self-injury, sleep disturbance and suicidal ideas. The patient is nervous/anxious.        Video Exam    There were no vitals filed for this visit.    Physical Exam  Nursing note reviewed.   Constitutional:       General: She is not in acute distress.     Appearance: She is not ill-appearing.   HENT:      Head: Atraumatic.   Eyes:      Extraocular Movements: Extraocular movements intact.      Conjunctiva/sclera: Conjunctivae normal.   Pulmonary:      Effort: Pulmonary effort is normal. No respiratory distress.   Neurological:      Mental Status: She is alert and oriented to person, place, and time.   Psychiatric:         Mood and Affect: Mood normal.         Behavior: Behavior normal.          Visit Time  Total Visit Duration: 15         This was a shared visit with the MARBELLA. I reviewed and verified the documentation and independently performed the documented:

## 2024-06-26 PROBLEM — E03.9 HYPOTHYROIDISM: Status: ACTIVE | Noted: 2024-06-26

## 2024-06-26 PROBLEM — G89.29 OTHER CHRONIC PAIN: Status: ACTIVE | Noted: 2024-06-26

## 2024-06-26 NOTE — ASSESSMENT & PLAN NOTE
Diagnosed in the past; trial of supplements was discontinued d/t adverse reactions.  TSH has been stable since stopping medication  Will continue to monitor

## 2024-06-26 NOTE — ASSESSMENT & PLAN NOTE
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  History of labral tear of the hip and recent history of thoracic outlet syndrome  Autoimmune workup and rheumatology evaluation were unremarkable for concerning findings  Follows with Ortho-recently started on Cymbalta and referred to PT; considering surgery for labral tear of hip  Patient referred to neurology for continued workup; appointment scheduled for August

## 2024-07-12 ENCOUNTER — TELEPHONE (OUTPATIENT)
Age: 19
End: 2024-07-12

## 2024-07-12 DIAGNOSIS — Z78.9 HEPATITIS B IMMUNE: Primary | ICD-10-CM

## 2024-07-12 NOTE — TELEPHONE ENCOUNTER
Please call patient to schedule appointment.  We can perform her annual physical and perform any necessary titers to prepare her for school.     Nadeen Boyd MD

## 2024-07-15 NOTE — TELEPHONE ENCOUNTER
Please call and inform patient I have ordered titers.  She can have drawn at her convenience.     Nadeen Boyd MD

## 2024-07-16 LAB
M TB IFN-G CD4+ BCKGRND COR BLD-ACNC: 0 IU/ML
M TB IFN-G CD4+ BCKGRND COR BLD-ACNC: 0 IU/ML
M TB IFN-G CD4+ T-CELLS BLD-ACNC: 0.02 IU/ML
M TB TUBERC IFN-G BLD QL: NEGATIVE
M TB TUBERC IGNF/MITOGEN IGNF CONTROL: >10 IU/ML

## 2024-07-16 NOTE — RESULT ENCOUNTER NOTE
Mindy,    Your screening for tuberculosis via blood test is negative.  Please let us know if you need anything else!    Thanks,  Magui Mike, DO

## 2024-07-24 ENCOUNTER — TELEPHONE (OUTPATIENT)
Age: 19
End: 2024-07-24

## 2024-08-19 ENCOUNTER — OFFICE VISIT (OUTPATIENT)
Dept: FAMILY MEDICINE CLINIC | Facility: CLINIC | Age: 19
End: 2024-08-19
Payer: COMMERCIAL

## 2024-08-19 VITALS
HEART RATE: 82 BPM | BODY MASS INDEX: 23.31 KG/M2 | RESPIRATION RATE: 16 BRPM | DIASTOLIC BLOOD PRESSURE: 68 MMHG | SYSTOLIC BLOOD PRESSURE: 100 MMHG | OXYGEN SATURATION: 98 % | HEIGHT: 67 IN | WEIGHT: 148.5 LBS | TEMPERATURE: 98.4 F

## 2024-08-19 DIAGNOSIS — Z00.00 ANNUAL PHYSICAL EXAM: Primary | ICD-10-CM

## 2024-08-19 DIAGNOSIS — Z23 ENCOUNTER FOR IMMUNIZATION: ICD-10-CM

## 2024-08-19 DIAGNOSIS — E03.9 HYPOTHYROIDISM, UNSPECIFIED TYPE: ICD-10-CM

## 2024-08-19 DIAGNOSIS — L70.0 ACNE VULGARIS: ICD-10-CM

## 2024-08-19 DIAGNOSIS — Z13.89 SCREENING FOR BLOOD OR PROTEIN IN URINE: ICD-10-CM

## 2024-08-19 DIAGNOSIS — Z12.4 CERVICAL CANCER SCREENING: ICD-10-CM

## 2024-08-19 DIAGNOSIS — G89.29 OTHER CHRONIC PAIN: ICD-10-CM

## 2024-08-19 DIAGNOSIS — T78.40XA ALLERGY, INITIAL ENCOUNTER: ICD-10-CM

## 2024-08-19 PROBLEM — Q27.9 VASCULAR MALFORMATION: Status: ACTIVE | Noted: 2024-08-19

## 2024-08-19 PROBLEM — Q27.9 VASCULAR MALFORMATION: Status: RESOLVED | Noted: 2024-08-19 | Resolved: 2024-08-19

## 2024-08-19 PROBLEM — G54.0 THORACIC OUTLET SYNDROME: Status: ACTIVE | Noted: 2024-08-19

## 2024-08-19 PROCEDURE — 90715 TDAP VACCINE 7 YRS/> IM: CPT

## 2024-08-19 PROCEDURE — 81002 URINALYSIS NONAUTO W/O SCOPE: CPT | Performed by: NURSE PRACTITIONER

## 2024-08-19 PROCEDURE — 90471 IMMUNIZATION ADMIN: CPT

## 2024-08-19 PROCEDURE — 99385 PREV VISIT NEW AGE 18-39: CPT | Performed by: NURSE PRACTITIONER

## 2024-08-19 RX ORDER — ISOTRETINOIN 20 MG/1
CAPSULE, GELATIN COATED ORAL
COMMUNITY
Start: 2024-06-04 | End: 2024-08-19 | Stop reason: ALTCHOICE

## 2024-08-19 RX ORDER — ISOTRETINOIN 30 MG/1
30 CAPSULE ORAL 2 TIMES DAILY
COMMUNITY
Start: 2024-07-31

## 2024-08-19 RX ORDER — EPINEPHRINE 0.3 MG/.3ML
0.3 INJECTION SUBCUTANEOUS ONCE
Qty: 0.6 ML | Refills: 0 | Status: SHIPPED | OUTPATIENT
Start: 2024-08-19 | End: 2024-08-19

## 2024-08-19 NOTE — PROGRESS NOTES
Adult Annual Physical  Name: Mindy Newman      : 2005      MRN: [id number]  Encounter Provider: JIMMIE Manley  Encounter Date: 2024   Encounter department: [facility]    TDAP today. Pt will be due for Hep B #2 & #3 for her 2nd Hep B series in the future since she was shown to be non-immune through titers.  GYN referral for cervical cancer screening.  Labs ordered.  F/u in 1 year for annual physical or sooner PRN.    Assessment & Plan   1. Annual physical exam    2. Allergy, initial encounter  -     EPINEPHrine (EPIPEN) 0.3 mg/0.3 mL SOAJ; Inject 0.3 mL (0.3 mg total) into a muscle once for 1 dose    3. Hypothyroidism, unspecified type  -     TSH, 3rd generation with Free T4 reflex; Future  -     Thyroid Antibodies Panel; Future    Pt not on any medication mgmt. Check TSH and thyroid antibody levels.    4. Encounter for immunization  -     TDAP VACCINE GREATER THAN OR EQUAL TO 8YO IM    5. Cervical cancer screening  -     Ambulatory Referral to Obstetrics / Gynecology; Future    6. Screening for blood or protein in urine  -     POCT urine dip    7. Other chronic pain    Managed by Naturopathic provider in [place]. Pt is on Cymbalta 30 mg BID.    8. Acne vulgaris    Managed by Derm in [place]. Pt is on Accutane.    Immunizations and preventive care screenings were discussed with patient today. Appropriate education was printed on patient's after visit summary.    Counseling:  Alcohol/drug use: discussed moderation in alcohol intake, the recommendations for healthy alcohol use, and avoidance of illicit drug use.  Dental Health: discussed importance of regular tooth brushing, flossing, and dental visits.  Injury prevention: discussed safety/seat belts, safety helmets, smoke detectors, carbon dioxide detectors, and smoking near bedding or upholstery.  Sexual health: discussed sexually transmitted diseases, partner selection, use of condoms, avoidance of  unintended pregnancy, and contraceptive alternatives.  Exercise: the importance of regular exercise/physical activity was discussed. Recommend exercise 3-5 times per week for at least 30 minutes.       Depression Screening and Follow-up Plan: Patient was screened for depression during today's encounter. They screened negative with a PHQ-2 score of 0.        History of Present Illness     Adult Annual Physical:  Patient presents for annual physical. New patient here today to establish care.  She is originally from [place] and is going to [employer] at [employer].  She is seeing Dermatologist who prescribes her Accutane.  She is also under care of a Naturopathic doctor and is taking Cymbalta daily for chronic pain.  In the past, she had some abnormal TSH values which were thought to be hypothyroid.  She was on thyroid supplementation for brief period, but since has been off this.  Pt was also found to be non-immune to Hep B.  She had her #1 dose of her 2nd Hep B series at [facility] in [place].  She is looking to have dose #2 & #3 here at our office.  Otherwise, pt feels well.  .     Diet and Physical Activity:  - Diet/Nutrition: well balanced diet, consuming 3-5 servings of fruits/vegetables daily and adequate fiber intake.  - Exercise: moderate cardiovascular exercise, 5-7 times a week on average and 1-2 hours on average.    Depression Screening:  - PHQ-2 Score: 0    General Health:  - Sleep: sleeps well and 7-8 hours of sleep on average.  - Hearing: normal hearing bilateral ears.  - Vision: no vision problems.  - Dental: brushes teeth twice daily and regular dental visits.    /GYN Health:    - Last menstrual cycle: 7/30/2024.   - Contraception: oral contraceptives.      Review of Systems   Constitutional: Negative.  Negative for chills and fatigue.   HENT: Negative.     Respiratory: Negative.  Negative for cough and shortness of breath.    Cardiovascular: Negative.  Negative for chest pain.   Gastrointestinal: Negative.   "  Genitourinary: Negative.    Musculoskeletal: Negative.  Negative for myalgias.   Neurological: Negative.          Objective     /68   Pulse 82   Temp 98.4 °F (36.9 °C)   Resp 16   Ht 5' 7\" (1.702 m)   Wt 67.4 kg (148 lb 8 oz)   SpO2 98%   BMI 23.26 kg/m²     Physical Exam  Vitals and nursing note reviewed.   Constitutional:       General: She is not in acute distress.     Appearance: Normal appearance. She is well-developed. She is not ill-appearing.   HENT:      Head: Normocephalic and atraumatic.      Right Ear: Tympanic membrane, ear canal and external ear normal.      Left Ear: Tympanic membrane, ear canal and external ear normal.   Eyes:      Conjunctiva/sclera: Conjunctivae normal.   Neck:      Vascular: No carotid bruit.   Cardiovascular:      Rate and Rhythm: Normal rate and regular rhythm.      Pulses: Normal pulses.      Heart sounds: Normal heart sounds. No murmur heard.  Pulmonary:      Effort: Pulmonary effort is normal. No respiratory distress.      Breath sounds: Normal breath sounds. No wheezing.   Abdominal:      General: There is no distension.      Palpations: Abdomen is soft. There is no mass.      Tenderness: There is no abdominal tenderness. There is no guarding or rebound.      Hernia: No hernia is present.   Musculoskeletal:         General: Normal range of motion.      Cervical back: Normal range of motion and neck supple.      Right lower leg: No edema.      Left lower leg: No edema.   Skin:     General: Skin is warm and dry.      Capillary Refill: Capillary refill takes less than 2 seconds.   Neurological:      Mental Status: She is alert and oriented to person, place, and time. Mental status is at baseline.      Motor: No weakness.      Gait: Gait normal.   Psychiatric:         Mood and Affect: Mood normal.         Behavior: Behavior normal.         Thought Content: Thought content normal.         Judgment: Judgment normal.         "

## 2024-08-20 LAB
SL AMB  POCT GLUCOSE, UA: NORMAL
SL AMB LEUKOCYTE ESTERASE,UA: NORMAL
SL AMB POCT BILIRUBIN,UA: NORMAL
SL AMB POCT BLOOD,UA: NORMAL
SL AMB POCT CLARITY,UA: CLEAR
SL AMB POCT COLOR,UA: YELLOW
SL AMB POCT KETONES,UA: NORMAL
SL AMB POCT NITRITE,UA: NORMAL
SL AMB POCT PH,UA: 6
SL AMB POCT SPECIFIC GRAVITY,UA: 1
SL AMB POCT URINE PROTEIN: NORMAL
SL AMB POCT UROBILINOGEN: 0.2

## 2024-08-23 ENCOUNTER — APPOINTMENT (OUTPATIENT)
Dept: LAB | Facility: CLINIC | Age: 19
End: 2024-08-23
Payer: COMMERCIAL

## 2024-08-23 ENCOUNTER — TRANSCRIBE ORDERS (OUTPATIENT)
Dept: LAB | Facility: CLINIC | Age: 19
End: 2024-08-23

## 2024-08-23 DIAGNOSIS — L70.0 NODULAR ELASTOSIS WITH CYSTS AND COMEDONES OF FAVRE AND RACOUCHOT: Primary | ICD-10-CM

## 2024-08-23 DIAGNOSIS — Z11.1 SCREENING FOR TUBERCULOSIS: ICD-10-CM

## 2024-08-23 DIAGNOSIS — Z01.84 IMMUNITY STATUS TESTING: ICD-10-CM

## 2024-08-23 DIAGNOSIS — L57.8 NODULAR ELASTOSIS WITH CYSTS AND COMEDONES OF FAVRE AND RACOUCHOT: Primary | ICD-10-CM

## 2024-08-23 DIAGNOSIS — L57.8 NODULAR ELASTOSIS WITH CYSTS AND COMEDONES OF FAVRE AND RACOUCHOT: ICD-10-CM

## 2024-08-23 DIAGNOSIS — Z78.9 HEPATITIS B IMMUNE: ICD-10-CM

## 2024-08-23 DIAGNOSIS — L70.0 NODULAR ELASTOSIS WITH CYSTS AND COMEDONES OF FAVRE AND RACOUCHOT: ICD-10-CM

## 2024-08-23 DIAGNOSIS — E03.9 HYPOTHYROIDISM, UNSPECIFIED TYPE: Primary | ICD-10-CM

## 2024-08-23 LAB
ALBUMIN SERPL BCG-MCNC: 4.5 G/DL (ref 3.5–5)
ALP SERPL-CCNC: 65 U/L (ref 34–104)
ALT SERPL W P-5'-P-CCNC: 18 U/L (ref 7–52)
ANION GAP SERPL CALCULATED.3IONS-SCNC: 7 MMOL/L (ref 4–13)
AST SERPL W P-5'-P-CCNC: 22 U/L (ref 13–39)
B-HCG SERPL-ACNC: <0.6 MIU/ML (ref 0–5)
BILIRUB SERPL-MCNC: 0.39 MG/DL (ref 0.2–1)
BUN SERPL-MCNC: 10 MG/DL (ref 5–25)
CALCIUM SERPL-MCNC: 9.3 MG/DL (ref 8.4–10.2)
CHLORIDE SERPL-SCNC: 102 MMOL/L (ref 96–108)
CHOLEST SERPL-MCNC: 167 MG/DL
CO2 SERPL-SCNC: 30 MMOL/L (ref 21–32)
CREAT SERPL-MCNC: 0.75 MG/DL (ref 0.6–1.3)
GFR SERPL CREATININE-BSD FRML MDRD: 114 ML/MIN/1.73SQ M
GLUCOSE P FAST SERPL-MCNC: 89 MG/DL (ref 65–99)
HDLC SERPL-MCNC: 52 MG/DL
LDLC SERPL CALC-MCNC: 101 MG/DL (ref 0–100)
NONHDLC SERPL-MCNC: 115 MG/DL
POTASSIUM SERPL-SCNC: 4.3 MMOL/L (ref 3.5–5.3)
PROT SERPL-MCNC: 7.3 G/DL (ref 6.4–8.4)
SODIUM SERPL-SCNC: 139 MMOL/L (ref 135–147)
TRIGL SERPL-MCNC: 71 MG/DL
TSH SERPL DL<=0.05 MIU/L-ACNC: 1.41 UIU/ML (ref 0.45–4.5)

## 2024-08-23 PROCEDURE — 86800 THYROGLOBULIN ANTIBODY: CPT

## 2024-08-23 PROCEDURE — 36415 COLL VENOUS BLD VENIPUNCTURE: CPT

## 2024-08-23 PROCEDURE — 86376 MICROSOMAL ANTIBODY EACH: CPT

## 2024-08-23 PROCEDURE — 80053 COMPREHEN METABOLIC PANEL: CPT

## 2024-08-23 PROCEDURE — 80061 LIPID PANEL: CPT

## 2024-08-23 PROCEDURE — 84702 CHORIONIC GONADOTROPIN TEST: CPT

## 2024-08-23 PROCEDURE — 84443 ASSAY THYROID STIM HORMONE: CPT

## 2024-08-24 LAB
THYROGLOB AB SERPL-ACNC: <1 IU/ML (ref 0–0.9)
THYROPEROXIDASE AB SERPL-ACNC: 46 IU/ML (ref 0–34)

## 2024-08-26 ENCOUNTER — TELEPHONE (OUTPATIENT)
Age: 19
End: 2024-08-26

## 2024-08-26 DIAGNOSIS — E03.9 HYPOTHYROIDISM, UNSPECIFIED TYPE: Primary | ICD-10-CM

## 2024-08-26 NOTE — TELEPHONE ENCOUNTER
Patient called requesting that labs for monthly Accutane bloodwork be ordered by PCP.  Patient is having issues with dermatology ordering. Patient would like script for CBC with diff, Hepatic function panel, and a CMP ordered.  Please advise.

## 2024-08-27 ENCOUNTER — APPOINTMENT (OUTPATIENT)
Dept: LAB | Facility: CLINIC | Age: 19
End: 2024-08-27
Payer: COMMERCIAL

## 2024-08-27 DIAGNOSIS — L70.9 ACNE, UNSPECIFIED ACNE TYPE: ICD-10-CM

## 2024-08-27 LAB
ALBUMIN SERPL BCG-MCNC: 4.4 G/DL (ref 3.5–5)
ALP SERPL-CCNC: 62 U/L (ref 34–104)
ALT SERPL W P-5'-P-CCNC: 16 U/L (ref 7–52)
AST SERPL W P-5'-P-CCNC: 21 U/L (ref 13–39)
BASOPHILS # BLD AUTO: 0.04 THOUSANDS/ÂΜL (ref 0–0.1)
BASOPHILS NFR BLD AUTO: 1 % (ref 0–1)
BILIRUB DIRECT SERPL-MCNC: 0.08 MG/DL (ref 0–0.2)
BILIRUB SERPL-MCNC: 0.28 MG/DL (ref 0.2–1)
EOSINOPHIL # BLD AUTO: 0.07 THOUSAND/ÂΜL (ref 0–0.61)
EOSINOPHIL NFR BLD AUTO: 2 % (ref 0–6)
ERYTHROCYTE [DISTWIDTH] IN BLOOD BY AUTOMATED COUNT: 13 % (ref 11.6–15.1)
HCT VFR BLD AUTO: 37.8 % (ref 34.8–46.1)
HGB BLD-MCNC: 12.2 G/DL (ref 11.5–15.4)
IMM GRANULOCYTES # BLD AUTO: 0.01 THOUSAND/UL (ref 0–0.2)
IMM GRANULOCYTES NFR BLD AUTO: 0 % (ref 0–2)
LYMPHOCYTES # BLD AUTO: 1.39 THOUSANDS/ÂΜL (ref 0.6–4.47)
LYMPHOCYTES NFR BLD AUTO: 33 % (ref 14–44)
MCH RBC QN AUTO: 31.4 PG (ref 26.8–34.3)
MCHC RBC AUTO-ENTMCNC: 32.3 G/DL (ref 31.4–37.4)
MCV RBC AUTO: 97 FL (ref 82–98)
MONOCYTES # BLD AUTO: 0.34 THOUSAND/ÂΜL (ref 0.17–1.22)
MONOCYTES NFR BLD AUTO: 8 % (ref 4–12)
NEUTROPHILS # BLD AUTO: 2.41 THOUSANDS/ÂΜL (ref 1.85–7.62)
NEUTS SEG NFR BLD AUTO: 56 % (ref 43–75)
NRBC BLD AUTO-RTO: 0 /100 WBCS
PLATELET # BLD AUTO: 359 THOUSANDS/UL (ref 149–390)
PMV BLD AUTO: 10.5 FL (ref 8.9–12.7)
PROT SERPL-MCNC: 7 G/DL (ref 6.4–8.4)
RBC # BLD AUTO: 3.89 MILLION/UL (ref 3.81–5.12)
WBC # BLD AUTO: 4.26 THOUSAND/UL (ref 4.31–10.16)

## 2024-08-27 PROCEDURE — 80076 HEPATIC FUNCTION PANEL: CPT

## 2024-08-27 PROCEDURE — 36415 COLL VENOUS BLD VENIPUNCTURE: CPT

## 2024-08-27 PROCEDURE — 85025 COMPLETE CBC W/AUTO DIFF WBC: CPT

## 2024-08-28 ENCOUNTER — CLINICAL SUPPORT (OUTPATIENT)
Dept: FAMILY MEDICINE CLINIC | Facility: CLINIC | Age: 19
End: 2024-08-28
Payer: COMMERCIAL

## 2024-08-28 DIAGNOSIS — Z23 ENCOUNTER FOR IMMUNIZATION: Primary | ICD-10-CM

## 2024-08-28 PROCEDURE — 90746 HEPB VACCINE 3 DOSE ADULT IM: CPT

## 2024-08-28 PROCEDURE — 90471 IMMUNIZATION ADMIN: CPT

## 2024-09-18 ENCOUNTER — OFFICE VISIT (OUTPATIENT)
Dept: FAMILY MEDICINE CLINIC | Facility: CLINIC | Age: 19
End: 2024-09-18
Payer: COMMERCIAL

## 2024-09-18 VITALS
DIASTOLIC BLOOD PRESSURE: 78 MMHG | TEMPERATURE: 98.8 F | SYSTOLIC BLOOD PRESSURE: 118 MMHG | HEIGHT: 67 IN | RESPIRATION RATE: 16 BRPM | WEIGHT: 151.8 LBS | HEART RATE: 71 BPM | BODY MASS INDEX: 23.83 KG/M2 | OXYGEN SATURATION: 98 %

## 2024-09-18 DIAGNOSIS — R39.9 URINARY SYMPTOM OR SIGN: Primary | ICD-10-CM

## 2024-09-18 LAB
SL AMB  POCT GLUCOSE, UA: ABNORMAL
SL AMB LEUKOCYTE ESTERASE,UA: ABNORMAL
SL AMB POCT BILIRUBIN,UA: ABNORMAL
SL AMB POCT BLOOD,UA: ABNORMAL
SL AMB POCT CLARITY,UA: CLEAR
SL AMB POCT COLOR,UA: YELLOW
SL AMB POCT KETONES,UA: ABNORMAL
SL AMB POCT NITRITE,UA: ABNORMAL
SL AMB POCT PH,UA: 7
SL AMB POCT SPECIFIC GRAVITY,UA: 1
SL AMB POCT URINE PROTEIN: ABNORMAL
SL AMB POCT UROBILINOGEN: 0.2

## 2024-09-18 PROCEDURE — 99213 OFFICE O/P EST LOW 20 MIN: CPT | Performed by: STUDENT IN AN ORGANIZED HEALTH CARE EDUCATION/TRAINING PROGRAM

## 2024-09-18 PROCEDURE — 81002 URINALYSIS NONAUTO W/O SCOPE: CPT | Performed by: STUDENT IN AN ORGANIZED HEALTH CARE EDUCATION/TRAINING PROGRAM

## 2024-09-18 NOTE — PROGRESS NOTES
Ambulatory Visit  Name: Mindy Newman      : 2005      MRN: [id number]  Encounter Provider: Ramona Tuttle MD  Encounter Date: 2024   Encounter department: [facility]    Assessment & Plan  Urinary symptom or sign  Discussed with patient her urine dip results, negative for leukocytes nitrates, positive for trace blood however patient is on her menstrual cycle  Given urinary frequency and symptoms discussed with patient we will send for culture to ensure no UTI; however very unlikely with these given urine dip results as discussed with patient    Orders:    POCT urine dip    Urine culture       History of Present Illness     Mindy is a 19-year-old female who presents to the office today for an acute visit.  Patient reports she has noticed increased thirst and urination.  Denies any discharge or pain with urination.  Patient does report she is on her cycle.  Denies any fever.      History obtained from : patient  Review of Systems   Constitutional:  Negative for fever.   Genitourinary:  Positive for dysuria and frequency.   Neurological:  Negative for dizziness.     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    Disease of thyroid gland     Hypothyroidism      Past Surgical History:   Procedure Laterality Date    WISDOM TOOTH EXTRACTION       Family History   Problem Relation Age of Onset    Hypertension Mother     Heart disease Maternal Grandfather     Cancer Maternal Grandmother     Heart disease Paternal Grandfather     Heart disease Paternal Grandmother     Breast cancer Paternal Grandmother     Cancer Paternal Grandmother      Current Outpatient Medications on File Prior to Visit   Medication Sig Dispense Refill    DULoxetine (CYMBALTA) 30 mg delayed release capsule Take 30 mg by mouth 2 (two) times a day      ISOtretinoin (ACCUTANE) 30 MG capsule Take 30 mg by mouth 2 (two) times a day       "EPINEPHrine (EPIPEN) 0.3 mg/0.3 mL SOAJ Inject 0.3 mL (0.3 mg total) into a muscle once for 1 dose 0.6 mL 0     No current facility-administered medications on file prior to visit.     Allergies   Allergen Reactions    Fish Allergy - Food Allergy Itching, Swelling, Throat Swelling and Tongue Swelling    Shellfish Allergy - Food Allergy Cough, Itching, Swelling, Throat Swelling and Tongue Swelling      Current Outpatient Medications on File Prior to Visit   Medication Sig Dispense Refill    DULoxetine (CYMBALTA) 30 mg delayed release capsule Take 30 mg by mouth 2 (two) times a day      ISOtretinoin (ACCUTANE) 30 MG capsule Take 30 mg by mouth 2 (two) times a day      EPINEPHrine (EPIPEN) 0.3 mg/0.3 mL SOAJ Inject 0.3 mL (0.3 mg total) into a muscle once for 1 dose 0.6 mL 0     No current facility-administered medications on file prior to visit.      Social History     Tobacco Use    Smoking status: Never    Smokeless tobacco: Never   Vaping Use    Vaping status: Never Used   Substance and Sexual Activity    Alcohol use: Yes     Alcohol/week: 2.0 standard drinks of alcohol     Types: 2 Standard drinks or equivalent per week     Comment: social    Drug use: Never    Sexual activity: Not Currently     Partners: Male     Birth control/protection: Abstinence         Objective     /78   Pulse 71   Temp 98.8 °F (37.1 °C)   Resp 16   Ht 5' 7\" (1.702 m)   Wt 68.9 kg (151 lb 12.8 oz)   SpO2 98%   BMI 23.78 kg/m²     Physical Exam  Eyes:      Extraocular Movements: Extraocular movements intact.   Pulmonary:      Effort: Pulmonary effort is normal.   Abdominal:      Tenderness: There is no right CVA tenderness or left CVA tenderness.   Neurological:      Mental Status: She is alert.   Psychiatric:         Mood and Affect: Mood normal.       Administrative Statements   I have spent a total time of 15 minutes in caring for this patient on the day of the visit/encounter including Instructions for management, Importance " of tx compliance, Risk factor reductions, Counseling / Coordination of care, Documenting in the medical record, and Obtaining or reviewing history  .

## 2024-09-20 LAB
BACTERIA UR CULT: NORMAL
Lab: NO GROWTH

## 2024-09-26 ENCOUNTER — OFFICE VISIT (OUTPATIENT)
Dept: FAMILY MEDICINE CLINIC | Facility: CLINIC | Age: 19
End: 2024-09-26
Payer: COMMERCIAL

## 2024-09-26 VITALS
RESPIRATION RATE: 15 BRPM | DIASTOLIC BLOOD PRESSURE: 80 MMHG | OXYGEN SATURATION: 100 % | HEIGHT: 67 IN | BODY MASS INDEX: 23.7 KG/M2 | TEMPERATURE: 97.1 F | SYSTOLIC BLOOD PRESSURE: 108 MMHG | WEIGHT: 151 LBS | HEART RATE: 61 BPM

## 2024-09-26 DIAGNOSIS — M25.552 LEFT HIP PAIN: ICD-10-CM

## 2024-09-26 DIAGNOSIS — R14.0 ABDOMINAL BLOATING: Primary | ICD-10-CM

## 2024-09-26 DIAGNOSIS — R63.1 INCREASED THIRST: ICD-10-CM

## 2024-09-26 PROCEDURE — 99214 OFFICE O/P EST MOD 30 MIN: CPT | Performed by: NURSE PRACTITIONER

## 2024-09-26 NOTE — PROGRESS NOTES
Ambulatory Visit  Name: Mindy Newman      : 2005      MRN: [id number]  Encounter Provider: JIMMIE Manley  Encounter Date: 2024   Encounter department: [facility]    Assessment & Plan  Abdominal bloating    Orders:    Celiac Disease Panel; Future    Food Allergy Profile; Future    Ambulatory Referral to Gastroenterology; Future    Pt will start OTC probiotic now. Check celiac and food allergy panel since her CBC and CMP were normal. Refer to GI if testing is negative and symptoms do not improve w/ probiotic. Patient is encouraged to call our office for any questions/concerns, persistent or worsening symptoms. Patient states they understand and agree with treatment plan.   Left hip pain    Orders:    Ambulatory Referral to Physical Therapy; Future    Will refer to PT for evaluation. If not better w/ PT, consider Ortho reevaluation.  Increased thirst       Most likely due to accutane. Pt will start to cut down on her caffeinated and sugar beverages as these can contribute to increased thirst. Patient is encouraged to call our office for any questions/concerns, persistent or worsening symptoms. Patient states they understand and agree with treatment plan.         Pt to f/u PRN.     History of Present Illness     Pt presents today for several concerns.  She does admit she has increased thirst.  She is on accutane.   She does drink #4 32 oz of water, 16 oz of coffee, 16 oz of tea, 16 ox of lemonade.    Additionally, pt notes abdominal bloating for years.  Denies NVD. No fever, chills or abdominal pain.  Cannot correlate w/ meals or foods.  Pt notes mom has hx of GERD, no other family hx of IBD.    Additionally, pt notes left hip pain.  She notes she had a labral tear 7 years ago that was confirmed on MRI 3 years ago.  She was working w/ Ortho and PT in [place].  Her pain recurred this past month.  She believes it may have been aggravated by core  "exercises.  She had an injection which did not provider relief.  Her Ortho provider at the time did not think she would benefit from surgery since she had minimal response w/ the the injection.  Pt notes pain w/ hip flexion.  She notes the pain is sharp.  Pt has not taken anything OTC for her pain.                History obtained from : patient  Review of Systems  As noted per HPI.  Medical History Reviewed by provider this encounter:           Objective     /80   Pulse 61   Temp (!) 97.1 °F (36.2 °C)   Resp 15   Ht 5' 7\" (1.702 m)   Wt 68.5 kg (151 lb)   SpO2 100%   BMI 23.65 kg/m²     Physical Exam  Vitals reviewed.   Constitutional:       Appearance: Normal appearance.   Cardiovascular:      Pulses: Normal pulses.      Heart sounds: Normal heart sounds.   Pulmonary:      Effort: Pulmonary effort is normal.      Breath sounds: Normal breath sounds.   Abdominal:      General: Bowel sounds are normal.      Palpations: Abdomen is soft.   Neurological:      Mental Status: She is alert and oriented to person, place, and time. Mental status is at baseline.   Psychiatric:         Mood and Affect: Mood normal.         Behavior: Behavior normal.         Thought Content: Thought content normal.         Judgment: Judgment normal.         "

## 2024-10-02 ENCOUNTER — CONSULT (OUTPATIENT)
Age: 19
End: 2024-10-02
Payer: COMMERCIAL

## 2024-10-02 VITALS
BODY MASS INDEX: 23.61 KG/M2 | SYSTOLIC BLOOD PRESSURE: 118 MMHG | DIASTOLIC BLOOD PRESSURE: 64 MMHG | HEIGHT: 67 IN | WEIGHT: 150.4 LBS

## 2024-10-02 DIAGNOSIS — Z12.4 SCREENING FOR CERVICAL CANCER: ICD-10-CM

## 2024-10-02 DIAGNOSIS — Z01.419 ENCOUNTER FOR ANNUAL ROUTINE GYNECOLOGICAL EXAMINATION: Primary | ICD-10-CM

## 2024-10-02 DIAGNOSIS — Z12.4 CERVICAL CANCER SCREENING: ICD-10-CM

## 2024-10-02 PROCEDURE — S0610 ANNUAL GYNECOLOGICAL EXAMINA: HCPCS | Performed by: OBSTETRICS & GYNECOLOGY

## 2024-10-02 RX ORDER — DIPHENOXYLATE HYDROCHLORIDE AND ATROPINE SULFATE 2.5; .025 MG/1; MG/1
1 TABLET ORAL DAILY
COMMUNITY

## 2024-10-04 ENCOUNTER — TRANSCRIBE ORDERS (OUTPATIENT)
Dept: LAB | Facility: CLINIC | Age: 19
End: 2024-10-04

## 2024-10-04 ENCOUNTER — APPOINTMENT (OUTPATIENT)
Dept: LAB | Facility: CLINIC | Age: 19
End: 2024-10-04
Payer: COMMERCIAL

## 2024-10-04 DIAGNOSIS — L70.0 NODULAR ELASTOSIS WITH CYSTS AND COMEDONES OF FAVRE AND RACOUCHOT: ICD-10-CM

## 2024-10-04 DIAGNOSIS — I70.0 ATHEROSCLEROSIS OF AORTA (HCC): Primary | ICD-10-CM

## 2024-10-04 DIAGNOSIS — L57.8 NODULAR ELASTOSIS WITH CYSTS AND COMEDONES OF FAVRE AND RACOUCHOT: ICD-10-CM

## 2024-10-04 DIAGNOSIS — I70.0 ATHEROSCLEROSIS OF AORTA (HCC): ICD-10-CM

## 2024-10-04 DIAGNOSIS — R14.0 ABDOMINAL BLOATING: ICD-10-CM

## 2024-10-04 DIAGNOSIS — L70.0 NODULAR ELASTOSIS WITH CYSTS AND COMEDONES OF FAVRE AND RACOUCHOT: Primary | ICD-10-CM

## 2024-10-04 DIAGNOSIS — L57.8 NODULAR ELASTOSIS WITH CYSTS AND COMEDONES OF FAVRE AND RACOUCHOT: Primary | ICD-10-CM

## 2024-10-04 LAB
ALBUMIN SERPL BCG-MCNC: 4.6 G/DL (ref 3.5–5)
ALP SERPL-CCNC: 58 U/L (ref 34–104)
ALT SERPL W P-5'-P-CCNC: 17 U/L (ref 7–52)
ANION GAP SERPL CALCULATED.3IONS-SCNC: 11 MMOL/L (ref 4–13)
AST SERPL W P-5'-P-CCNC: 24 U/L (ref 13–39)
B-HCG SERPL-ACNC: <0.6 MIU/ML (ref 0–5)
BASOPHILS # BLD AUTO: 0.03 THOUSANDS/ΜL (ref 0–0.1)
BASOPHILS NFR BLD AUTO: 1 % (ref 0–1)
BILIRUB SERPL-MCNC: 0.29 MG/DL (ref 0.2–1)
BUN SERPL-MCNC: 8 MG/DL (ref 5–25)
CALCIUM SERPL-MCNC: 9 MG/DL (ref 8.4–10.2)
CHLORIDE SERPL-SCNC: 100 MMOL/L (ref 96–108)
CHOLEST SERPL-MCNC: 183 MG/DL
CO2 SERPL-SCNC: 26 MMOL/L (ref 21–32)
CREAT SERPL-MCNC: 0.74 MG/DL (ref 0.6–1.3)
EOSINOPHIL # BLD AUTO: 0.04 THOUSAND/ΜL (ref 0–0.61)
EOSINOPHIL NFR BLD AUTO: 1 % (ref 0–6)
ERYTHROCYTE [DISTWIDTH] IN BLOOD BY AUTOMATED COUNT: 12.5 % (ref 11.6–15.1)
GFR SERPL CREATININE-BSD FRML MDRD: 116 ML/MIN/1.73SQ M
GLUCOSE P FAST SERPL-MCNC: 81 MG/DL (ref 65–99)
HCT VFR BLD AUTO: 37.9 % (ref 34.8–46.1)
HDLC SERPL-MCNC: 53 MG/DL
HGB BLD-MCNC: 12.2 G/DL (ref 11.5–15.4)
IMM GRANULOCYTES # BLD AUTO: 0.01 THOUSAND/UL (ref 0–0.2)
IMM GRANULOCYTES NFR BLD AUTO: 0 % (ref 0–2)
LDLC SERPL CALC-MCNC: 107 MG/DL (ref 0–100)
LYMPHOCYTES # BLD AUTO: 1.24 THOUSANDS/ΜL (ref 0.6–4.47)
LYMPHOCYTES NFR BLD AUTO: 31 % (ref 14–44)
MCH RBC QN AUTO: 30.5 PG (ref 26.8–34.3)
MCHC RBC AUTO-ENTMCNC: 32.2 G/DL (ref 31.4–37.4)
MCV RBC AUTO: 95 FL (ref 82–98)
MONOCYTES # BLD AUTO: 0.34 THOUSAND/ΜL (ref 0.17–1.22)
MONOCYTES NFR BLD AUTO: 9 % (ref 4–12)
NEUTROPHILS # BLD AUTO: 2.29 THOUSANDS/ΜL (ref 1.85–7.62)
NEUTS SEG NFR BLD AUTO: 58 % (ref 43–75)
NONHDLC SERPL-MCNC: 130 MG/DL
NRBC BLD AUTO-RTO: 0 /100 WBCS
PLATELET # BLD AUTO: 341 THOUSANDS/UL (ref 149–390)
PMV BLD AUTO: 10.4 FL (ref 8.9–12.7)
POTASSIUM SERPL-SCNC: 4 MMOL/L (ref 3.5–5.3)
PROT SERPL-MCNC: 7 G/DL (ref 6.4–8.4)
RBC # BLD AUTO: 4 MILLION/UL (ref 3.81–5.12)
SODIUM SERPL-SCNC: 137 MMOL/L (ref 135–147)
TRIGL SERPL-MCNC: 114 MG/DL
WBC # BLD AUTO: 3.95 THOUSAND/UL (ref 4.31–10.16)

## 2024-10-04 PROCEDURE — 80053 COMPREHEN METABOLIC PANEL: CPT

## 2024-10-04 PROCEDURE — 84702 CHORIONIC GONADOTROPIN TEST: CPT

## 2024-10-04 PROCEDURE — 80061 LIPID PANEL: CPT

## 2024-10-04 PROCEDURE — 36415 COLL VENOUS BLD VENIPUNCTURE: CPT

## 2024-10-04 PROCEDURE — 85025 COMPLETE CBC W/AUTO DIFF WBC: CPT

## 2024-10-07 NOTE — PROGRESS NOTES
"Assessment/Plan:     1. Encounter for annual routine gynecological examination      2. Screening for cervical cancer    - IGP, rfx Aptima HPV ASCU    3. Cervical cancer screening    - Ambulatory Referral to Obstetrics / Gynecology        Subjective      Mindy Newman is a 19 y.o. female who presents for annual exam. Periods are regular.  She denies any breast or sexual concerns.      Current contraception: condoms  History of abnormal Pap smear: no  Regular self breast exam: yes  History of abnormal mammogram: no  History of abnormal lipids: no    Menstrual History:  Nulligravida  Menarche age: 10  Patient's last menstrual period was 09/15/2024 (within weeks).  Period Cycle (Days): 28  Period Duration (Days): 4-5  Period Pattern: Regular  Menstrual Flow: Heavy, Moderate (Heavy x2-3 days)  Menstrual Control: Maxi pad, Tampon  Menstrual Control Change Freq (Hours): 3 (super plus)  Dysmenorrhea: (!) Moderate  Dysmenorrhea Symptoms: Cramping, Other (Comment) (lower back pain)    Past Medical History:   Diagnosis Date    Anxiety 03/2022    School counselor    Disease of thyroid gland     Hypothyroidism        Family History   Problem Relation Age of Onset    Hypertension Mother     No Known Problems Father     Cancer Maternal Grandmother     Heart disease Maternal Grandfather     Heart disease Paternal Grandmother     Breast cancer Paternal Grandmother     Cancer Paternal Grandmother     Heart disease Paternal Grandfather        The following portions of the patient's history were reviewed and updated as appropriate: allergies, current medications, past family history, past medical history, past social history, past surgical history, and problem list.    Review of Systems  Pertinent items are noted in HPI.      Objective      /64 (BP Location: Right arm, Patient Position: Sitting, Cuff Size: Large)   Ht 5' 6.5\" (1.689 m)   Wt 68.2 kg (150 lb 6.4 oz)   LMP 09/15/2024 (Within Weeks)   BMI 23.91 kg/m²     General: "   alert and oriented, in no acute distress   Heart:  Breasts: regular rate and rhythm  appear normal, no suspicious masses, no skin or nipple changes or axillary nodes.   Lungs: Effort normal   Abdomen: soft, non-tender, without masses or organomegaly   Vulva: normal   Vagina: normal mucosa   Cervix: no lesions   Uterus: normal size, mobile, non-tender   Adnexa:  Bladder: normal adnexa and no mass, fullness, tenderness  Non-tender

## 2024-10-15 ENCOUNTER — EVALUATION (OUTPATIENT)
Dept: PHYSICAL THERAPY | Facility: CLINIC | Age: 19
End: 2024-10-15
Payer: COMMERCIAL

## 2024-10-15 DIAGNOSIS — M25.552 LEFT HIP PAIN: Primary | ICD-10-CM

## 2024-10-15 PROCEDURE — 97161 PT EVAL LOW COMPLEX 20 MIN: CPT | Performed by: PHYSICAL THERAPIST

## 2024-10-15 PROCEDURE — 97140 MANUAL THERAPY 1/> REGIONS: CPT | Performed by: PHYSICAL THERAPIST

## 2024-10-15 NOTE — PROGRESS NOTES
PT Evaluation     Today's date: 10/15/2024  Patient name: Mindy Newman  : 2005  MRN: [id number]  Referring provider: Ethel Ferguson, *  Dx:   Encounter Diagnosis     ICD-10-CM    1. Left hip pain  M25.552 Ambulatory Referral to Physical Therapy                     Assessment  Impairments: abnormal or restricted ROM, activity intolerance, impaired physical strength, lacks appropriate home exercise program and pain with function  Functional limitations: standing; core; running  Symptom irritability: low    Assessment details: Pt is 18yo female presenting to therapy following chronic Lt hip symptoms. Pt has old partial labral tear diagnosed in . Recent flare up in August with increased pain with hip flexion and IR, lumbar screen was WNL. Pt has (+) supine to long sit test for anterior rotation on the Lt, which correlate to subjective symptoms as well. Pt would benefit from PT services to improve symptoms and return to PLOF.  Understanding of Dx/Px/POC: good     Prognosis: good    Goals  1. Pt will be independent with HEP upon discharge.  2. Pt will improve Lt hip flexion to WNL and no increased pain to do core exercises.  3. Pt will be able to complete gym routine without increased symptoms.  4. Pt will report less pain throughout her day.    Plan  Patient would benefit from: skilled physical therapy    Planned therapy interventions: functional ROM exercises, therapeutic activities, therapeutic exercise, therapeutic training, stretching, strengthening, home exercise program, neuromuscular re-education, manual therapy and patient education    Frequency: 2x week  Duration in weeks: 6  Treatment plan discussed with: patient    Subjective Evaluation    History of Present Illness  Mechanism of injury: Pt reports MRI in  showed Lt partial labral tear. Initially injured in . 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 Rotation and quick movements, sprinting also bother it.  Has done PT for her hip, also did PT for her back and radicular symptoms. Pt reports popping in the hip a lot, some catching and crunchy. Laying prone and squeezing the thighs increase audible pop. Pt can walk for exercise, light weight squats and PT exercises, SL RDL, airplanes, core exercises. She likes pilates Core exercises bother it. Pt wants to return to running. 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 Pt reports US guided injection over a year ago that didn't help as much. Labral consult was not suggested at this time.   Quality of life: good    Patient Goals  Patient goals for therapy: decreased pain    Pain  Current pain ratin  At worst pain ratin  Location: groin, anterior hip  Quality: sharp  Aggravating factors: running, standing and walking (hip flexion movements)    Treatments  Previous treatment: physical therapy    Objective     Tenderness     Additional Tenderness Details  Tenderness along the left superior glute    Lumbar Screen  Lumbar range of motion within normal limits with the following exceptions:Increased pressure with weight shifting on to the Lt hip    Active Range of Motion   Left Hip   Normal active range of motion    Right Hip   Normal active range of motion    Additional Active Range of Motion Details  Pain with hip flexion end range, IR    Strength/Myotome Testing     Left Hip   Normal muscle strength  Planes of Motion   Flexion: 4    Right Hip   Normal muscle strength    Tests     Left Hip   Positive long sit.     Additional Tests Details  Supine to long sit (+) for Left anterior rotation.  Sit slump test (+)           Precautions: none      Manuals 10/15            Supine to long sit (+) ant rot            Ant rot MET FH 8x5''            Lt hip mobs             Lt hip distraction             Neuro Re-Ed                                                                                                        Ther Ex             TA contraction 10x10''            Front plank HEP            Side Plank  HEP            Bear Crawl HEP            Dead bug Iso             Bridge+ball sq HEP                                      Ther Activity             Squats+press out             Leg Press             Gait Training                                       Modalities

## 2024-10-22 ENCOUNTER — IMMUNIZATIONS (OUTPATIENT)
Dept: FAMILY MEDICINE CLINIC | Facility: CLINIC | Age: 19
End: 2024-10-22
Payer: COMMERCIAL

## 2024-10-22 ENCOUNTER — OFFICE VISIT (OUTPATIENT)
Dept: PHYSICAL THERAPY | Facility: CLINIC | Age: 19
End: 2024-10-22
Payer: COMMERCIAL

## 2024-10-22 DIAGNOSIS — M25.552 LEFT HIP PAIN: Primary | ICD-10-CM

## 2024-10-22 DIAGNOSIS — Z23 ENCOUNTER FOR IMMUNIZATION: Primary | ICD-10-CM

## 2024-10-22 PROCEDURE — 90471 IMMUNIZATION ADMIN: CPT | Performed by: NURSE PRACTITIONER

## 2024-10-22 PROCEDURE — 97530 THERAPEUTIC ACTIVITIES: CPT

## 2024-10-22 PROCEDURE — 97110 THERAPEUTIC EXERCISES: CPT

## 2024-10-22 PROCEDURE — 90471 IMMUNIZATION ADMIN: CPT

## 2024-10-22 PROCEDURE — 90656 IIV3 VACC NO PRSV 0.5 ML IM: CPT | Performed by: NURSE PRACTITIONER

## 2024-10-22 PROCEDURE — 97140 MANUAL THERAPY 1/> REGIONS: CPT

## 2024-10-22 PROCEDURE — 90656 IIV3 VACC NO PRSV 0.5 ML IM: CPT

## 2024-10-22 NOTE — PROGRESS NOTES
"Daily Note     Today's date: 10/22/2024  Patient name: Mindy Newman  : 2005  MRN: [id number]  Referring provider: Ethel Ferguson, *  Dx:   Encounter Diagnosis     ICD-10-CM    1. Left hip pain  M25.552           Start Time: 1700  Stop Time: 1753  Total time in clinic (min): 53 minutes    Subjective: Patient reports new onset of R sided SIJ pain especially with resisted hip flexion. She is unsure on what may have manifested these symptoms.       Objective: See treatment diary below      Assessment: Initiated core stabilization exercises as per flowsheet. Challenged with planks on Lt>Rt side but is able to maintain adequate form t/o. No adverse response with isometric hip flexion and noted no pain with this movement post-tx. Laser treatment effective in reducing inflammation, pain, and tension of B/L hip flexor. Patient would benefit from continued PT to improve level of function.       Plan: Continue per POC. Increase reps/resistance as tolerated.      Precautions: none      Manuals 10/15 10/22           Supine to long sit (+) ant rot (+) R ant rot           Ant rot MET FH 8x5'' FH 8x5''           Lt hip mobs             Lt hip distraction             Laser  B/l SIJ & hip flexor 16W 4' ea           Neuro Re-Ed                                                                                                        Ther Ex             TA contraction 10x10'' 10x10''           Iso abd crunch  5\" x 15           Front plank HEP W/ hip ext 10x ea           Side Plank HEP 30\"x3 ea            Bear Crawl HEP            Dead bug Iso  5\" x 10 e            Bridge+ball sq HEP 5\" 2x10           Paloff Press             TrA row/ext              Bird/dogs  10x ea                         Ther Activity             Upright bike  6'           Squats+press out             Leg Press             Gait Training                                       Modalities                                            "

## 2024-10-24 ENCOUNTER — OFFICE VISIT (OUTPATIENT)
Dept: PHYSICAL THERAPY | Facility: CLINIC | Age: 19
End: 2024-10-24
Payer: COMMERCIAL

## 2024-10-24 DIAGNOSIS — M25.552 LEFT HIP PAIN: Primary | ICD-10-CM

## 2024-10-24 PROCEDURE — 97110 THERAPEUTIC EXERCISES: CPT | Performed by: PHYSICAL THERAPIST

## 2024-10-24 PROCEDURE — 97140 MANUAL THERAPY 1/> REGIONS: CPT | Performed by: PHYSICAL THERAPIST

## 2024-10-24 NOTE — PROGRESS NOTES
"Daily Note     Today's date: 10/24/2024  Patient name: Mindy Newman  : 2005  MRN: [id number]  Referring provider: Ethel Ferguson, *  Dx:   Encounter Diagnosis     ICD-10-CM    1. Left hip pain  M25.552                      Subjective: Pt still reports the Rt hip being more restricted than the Lt. Her core was sore after last session.      Objective: See treatment diary below      Assessment: Pt still with (+) rt ant rotation with difficulty correcting with MET. Posterior SIJ mobs improved as well as laser. Pt tolerating core stability exercises well with fatigue.      Plan: Continue per plan of care.      Precautions: none      Manuals 10/15 10/22 10/24          Supine to long sit (+) ant rot (+) R ant rot (+) Rt ant rot          Ant rot MET FH 8x5'' FH 8x5'' FH 8x5''          Lt hip mobs   SIJ mobs          Lt hip distraction             Laser  B/l SIJ & hip flexor 16W 4' ea SIJ B/l 16W 4'          Neuro Re-Ed                                                                                                        Ther Ex             TA contraction 10x10'' 10x10'' 10x10''          Iso abd crunch  5\" x 15           Front plank HEP W/ hip ext 10x ea           Side Plank HEP 30\"x3 ea  3x30'' Lt          Bear Crawl HEP            Dead bug Iso  5\" x 10 e  20x5'' + ball sq          Bridge+ball sq HEP 5\" 2x10 20x5''          Paloff Press   15x5'' black          TrA row/ext              Bird/dogs  10x ea                         Ther Activity             Upright bike  6' 8'          Squats+press out             Leg Press             Gait Training                                       Modalities                                              "

## 2024-10-29 ENCOUNTER — OFFICE VISIT (OUTPATIENT)
Dept: PHYSICAL THERAPY | Facility: CLINIC | Age: 19
End: 2024-10-29
Payer: COMMERCIAL

## 2024-10-29 DIAGNOSIS — M25.552 LEFT HIP PAIN: Primary | ICD-10-CM

## 2024-10-29 PROCEDURE — 97140 MANUAL THERAPY 1/> REGIONS: CPT

## 2024-10-29 PROCEDURE — 97110 THERAPEUTIC EXERCISES: CPT

## 2024-10-29 NOTE — PROGRESS NOTES
"Daily Note     Today's date: 10/29/2024  Patient name: Mindy Newman  : 2005  MRN: [id number]  Referring provider: Ethel Ferguson, *  Dx: No diagnosis found.               Subjective: ***      Objective: See treatment diary below      Assessment: Patient tolerated treatment session well.       Plan: Continue per POC. Increase reps/resistance as tolerated.      Precautions: none      Manuals 10/15 10/22 10/24 10/29         Supine to long sit (+) ant rot (+) R ant rot (+) Rt ant rot          Ant rot MET FH 8x5'' FH 8x5'' FH 8x5''          Lt hip mobs   SIJ mobs          Lt hip distraction             Laser  B/l SIJ & hip flexor 16W 4' ea SIJ B/l 16W 4'          Neuro Re-Ed                                                                                                        Ther Ex             TA contraction 10x10'' 10x10'' 10x10''          Iso abd crunch  5\" x 15           Front plank HEP W/ hip ext 10x ea           Side Plank HEP 30\"x3 ea  3x30'' Lt          Bear Crawl HEP            Dead bug Iso  5\" x 10 e  20x5'' + ball sq          Bridge+ball sq HEP 5\" 2x10 20x5''          Paloff Press   15x5'' black          TrA row/ext              Bird/dogs  10x ea                         Ther Activity             Upright bike  6' 8'          Squats+press out             Leg Press             Gait Training                                       Modalities                                                "

## 2024-10-29 NOTE — PROGRESS NOTES
"Daily Note     Today's date: 10/29/2024  Patient name: Mindy Newman  : 2005  MRN: [id number]  Referring provider: Ethel Ferguson, *  Dx:   Encounter Diagnosis     ICD-10-CM    1. Left hip pain  M25.552                      Subjective: Pt states her back is really bothering her this week. States she feels restricted along the bra strap area and is having some pain. Hip is also still restricted on the R more than the left.       Objective: See treatment diary below    Thoracic rotation pre mobilization  R 30 deg, slight reproduction of sx   Lt 40 deg    Thoracic rotation post mobilization  R 40 deg, slight reproduction of sx   Lt 45 deg    Assessment: Pt still with (+) rt ant rotation with improvement, however, remaining difficulty correcting with MET. Today's session focused on manual therapy. Thoracic spine addressed today due to overall coordination impairments and compensatory spinal motions that may be contributing to hip and SIJ symptoms. Improvement in thoracic motion noted post manual therapy. TE completed after to reinforce gains made from manuals. Encouraged core and thoracic mobility between now and next session.       Plan: Continue per plan of care.      Precautions: none      Manuals 10/15 10/22 10/24 11/29         Supine to long sit (+) ant rot (+) R ant rot (+) Rt ant rot (+) Rt ant rot         Ant rot MET FH 8x5'' FH 8x5'' FH 8x5'' MDD 8x5''  2 sets         Lt hip mobs   SIJ mobs SIJ mobs         Lt hip distraction             Laser  B/l SIJ & hip flexor 16W 4' ea SIJ B/l 16W 4'          Thoracic mobilization    Gr V mobilization to thoracic spine  (Measurements to inform intervention )         Neuro Re-Ed                                                                                                        Ther Ex             TA contraction 10x10'' 10x10'' 10x10''          Iso abd crunch  5\" x 15           Front plank HEP W/ hip ext 10x ea           Side Plank HEP 30\"x3 ea  3x30'' Lt  " "        Bear Crawl HEP            Dead bug Iso  5\" x 10 e  20x5'' + ball sq          Bridge+ball sq HEP 5\" 2x10 20x5'' 20x5''         Paloff Press   15x5'' black          TrA row/ext              Bird/dogs  10x ea            Thoracic rotations in kneeling     10x each side          Ther Activity             Upright bike  6' 8'          Squats+press out             Leg Press             Gait Training                                       Modalities                                              "

## 2024-10-31 ENCOUNTER — OFFICE VISIT (OUTPATIENT)
Dept: PHYSICAL THERAPY | Facility: CLINIC | Age: 19
End: 2024-10-31
Payer: COMMERCIAL

## 2024-10-31 DIAGNOSIS — M25.552 LEFT HIP PAIN: Primary | ICD-10-CM

## 2024-10-31 PROCEDURE — 97110 THERAPEUTIC EXERCISES: CPT | Performed by: PHYSICAL THERAPIST

## 2024-10-31 PROCEDURE — 97140 MANUAL THERAPY 1/> REGIONS: CPT | Performed by: PHYSICAL THERAPIST

## 2024-10-31 NOTE — PROGRESS NOTES
"Daily Note     Today's date: 10/31/2024  Patient name: Mindy Newman  : 2005  MRN: [id number]  Referring provider: Ethel Ferguson, *  Dx:   Encounter Diagnosis     ICD-10-CM    1. Left hip pain  M25.552                      Subjective: Pt reports feeling better with less restrictions in her hips.      Objective: See treatment diary below      Assessment: Pt feeling more Rt sided hip symptoms. Added hip mobilizations today. Elliptical was alittle bothersome on Rt hip going into flexion. Pt tolerating core stability well.      Plan: Continue per plan of care.      Precautions: none      Manuals 10/15 10/22 10/24 11/29 11/31        Supine to long sit (+) ant rot (+) R ant rot (+) Rt ant rot (+) Rt ant rot (-)        Ant rot MET FH 8x5'' FH 8x5'' FH 8x5'' MDD 8x5''  2 sets         Lt hip mobs   SIJ mobs SIJ mobs B/l hip mobs; SIJ mobs on Rt hip ext        Lt hip distraction             Laser  B/l SIJ & hip flexor 16W 4' ea SIJ B/l 16W 4'          Thoracic mobilization    Gr V mobilization to thoracic spine  (Measurements to inform intervention ) Gr 5        Neuro Re-Ed                                                                                                        Ther Ex             TA contraction 10x10'' 10x10'' 10x10''          Iso abd crunch  5\" x 15           Front plank HEP W/ hip ext 10x ea           Side Plank HEP 30\"x3 ea  3x30'' Lt          Bear Crawl HEP            Dead bug Iso  5\" x 10 e  20x5'' + ball sq  20x5'' + ball sq        Bridge+ball sq HEP 5\" 2x10 20x5'' 20x5'' 20x5''        Paloff Press   15x5'' black  15x5'' black        TrA row/ext              Bird/dogs  10x ea            Thoracic rotations in kneeling     10x each side  10xea         Ther Activity             Upright bike  6' 8'  Elliptical 5'        Squats+press out             Leg Press             Gait Training                                       Modalities                                                "

## 2024-11-04 ENCOUNTER — OFFICE VISIT (OUTPATIENT)
Dept: PHYSICAL THERAPY | Facility: CLINIC | Age: 19
End: 2024-11-04
Payer: COMMERCIAL

## 2024-11-04 DIAGNOSIS — M25.552 LEFT HIP PAIN: Primary | ICD-10-CM

## 2024-11-04 PROCEDURE — 97140 MANUAL THERAPY 1/> REGIONS: CPT | Performed by: PHYSICAL THERAPIST

## 2024-11-04 PROCEDURE — 97110 THERAPEUTIC EXERCISES: CPT | Performed by: PHYSICAL THERAPIST

## 2024-11-04 NOTE — PROGRESS NOTES
"Daily Note     Today's date: 2024  Patient name: Mindy Newman  : 2005  MRN: [id number]  Referring provider: Ethel Ferguson, *  Dx:   Encounter Diagnosis     ICD-10-CM    1. Left hip pain  M25.552                      Subjective: Pt reports that after LV she experienced increased in hip soreness and was having difficulty walking form her car uphill her apt. Reports that her pain returned to baseline the next day. Reports continued compliance with her hep      Objective: See treatment diary below      Assessment: SIJ rotation reduces easily with shotgun MET and is maintained t/o session.  Anterior L hip pain induced with hip flexion and IR, this is improved with lateral glide MWM. Requires verbal cues for initiation of TaA but form improves with reps. No sig pain reported t/o stabilization program. Monitor response and progress as rosy NV..       Plan: Continue per plan of care.      Precautions: none      Manuals 10/15 10/22 10/24 10/29 10/31 11/4       Supine to long sit (+) ant rot (+) R ant rot (+) Rt ant rot (+) Rt ant rot (-) (+) reduce with shotgun MET       Ant rot MET FH 8x5'' FH 8x5'' FH 8x5'' MDD 8x5''  2 sets         Lt hip mobs   SIJ mobs SIJ mobs B/l hip mobs; SIJ mobs on Rt hip ext B/L hip MWM       Lt hip distraction             Laser  B/l SIJ & hip flexor 16W 4' ea SIJ B/l 16W 4'          Thoracic mobilization    Gr V mobilization to thoracic spine  (Measurements to inform intervention ) Gr 5 Grd 5       Neuro Re-Ed                                                                                                        Ther Ex             TA contraction 10x10'' 10x10'' 10x10''          Iso abd crunch  5\" x 15    Pball press isometric 5\"x0       Front plank HEP W/ hip ext 10x ea           Side Plank HEP 30\"x3 ea  3x30'' Lt          Bear Crawl HEP            Dead bug Iso  5\" x 10 e  20x5'' + ball sq  20x5'' + ball sq 5\"2x10                    Bridge+ball sq HEP 5\" 2x10 20x5'' 20x5'' " "20x5'' 20x5\" cues for glute activation       Paloff Press   15x5'' black  15x5'' black 15x5\"black tb       TrA row/ext              Bird/dogs  10x ea            Thoracic rotations in kneeling     10x each side  10xea         Prone hep ext      Knee bent 5\"x10ea       Ther Activity             Upright bike  6' 8'  Elliptical 5' Elliptial 5'       Squats+press out             Leg Press             Gait Training                                       Modalities                                                "

## 2024-11-05 DIAGNOSIS — Z01.84 LACK OF IMMUNITY TO HEPATITIS B VIRUS DEMONSTRATED BY SEROLOGIC TEST: Primary | ICD-10-CM

## 2024-11-07 ENCOUNTER — OFFICE VISIT (OUTPATIENT)
Dept: PHYSICAL THERAPY | Facility: CLINIC | Age: 19
End: 2024-11-07
Payer: COMMERCIAL

## 2024-11-07 DIAGNOSIS — M25.552 LEFT HIP PAIN: Primary | ICD-10-CM

## 2024-11-07 PROCEDURE — 97110 THERAPEUTIC EXERCISES: CPT | Performed by: PHYSICAL THERAPIST

## 2024-11-07 PROCEDURE — 97140 MANUAL THERAPY 1/> REGIONS: CPT | Performed by: PHYSICAL THERAPIST

## 2024-11-07 NOTE — PROGRESS NOTES
"Daily Note     Today's date: 2024  Patient name: Mindy Newman  : 2005  MRN: [id number]  Referring provider: Ethel Ferguson, *  Dx:   Encounter Diagnosis     ICD-10-CM    1. Left hip pain  M25.552                      Subjective: Pt reports she doing okay. Hips seem good. Back is still sore.      Objective: See treatment diary below      Assessment: Pt still long the Rt in both positions. Pt felt tightness with SIJ mobs but hip mobs felt WNL. Progressed stability exercises. Monitor and continue to progress as able.      Plan: Continue per plan of care.      Precautions: none      Manuals 10/15 10/22 10/24 10/29 10/31 11/4 11/7      Supine to long sit (+) ant rot (+) R ant rot (+) Rt ant rot (+) Rt ant rot (-) (+) reduce with shotgun MET       Ant rot MET FH 8x5'' FH 8x5'' FH 8x5'' MDD 8x5''  2 sets         Lt hip mobs   SIJ mobs SIJ mobs B/l hip mobs; SIJ mobs on Rt hip ext B/L hip MWM B/l hip mobs; SIJ mobs on Rt hip ext      Lt hip distraction             Laser  B/l SIJ & hip flexor 16W 4' ea SIJ B/l 16W 4'          Thoracic mobilization    Gr V mobilization to thoracic spine  (Measurements to inform intervention ) Gr 5 Grd 5 Gr5      Neuro Re-Ed                                                                                                        Ther Ex             TA contraction 10x10'' 10x10'' 10x10''          Iso abd crunch  5\" x 15    Pball press isometric 5\"x0       Front plank HEP W/ hip ext 10x ea           Side Plank HEP 30\"x3 ea  3x30'' Lt          Bear Crawl HEP            Dead bug Iso  5\" x 10 e  20x5'' + ball sq  20x5'' + ball sq 5\"2x10 2x10                   Bridge+ball sq HEP 5\" 2x10 20x5'' 20x5'' 20x5'' 20x5\" cues for glute activation Figure 4 bridge 20x5''      Paloff Press   15x5'' black  15x5'' black 15x5\"black tb 15x5\"black tb      TrA row/ext              Bird/dogs  10x ea            Thoracic rotations in kneeling     10x each side  10xea         Prone hep ext      Knee " "bent 5\"x10ea       Ther Activity             Upright bike  6' 8'  Elliptical 5' Elliptial 5' Elliptial 5'      Squats+press out             Leg Press             Gait Training                                       Modalities                                                  "

## 2024-11-12 ENCOUNTER — APPOINTMENT (OUTPATIENT)
Dept: LAB | Facility: CLINIC | Age: 19
End: 2024-11-12
Payer: COMMERCIAL

## 2024-11-12 ENCOUNTER — OFFICE VISIT (OUTPATIENT)
Dept: PHYSICAL THERAPY | Facility: CLINIC | Age: 19
End: 2024-11-12
Payer: COMMERCIAL

## 2024-11-12 DIAGNOSIS — L57.8 NODULAR ELASTOSIS WITH CYSTS AND COMEDONES OF FAVRE AND RACOUCHOT: ICD-10-CM

## 2024-11-12 DIAGNOSIS — L70.0 NODULAR ELASTOSIS WITH CYSTS AND COMEDONES OF FAVRE AND RACOUCHOT: ICD-10-CM

## 2024-11-12 DIAGNOSIS — M25.552 LEFT HIP PAIN: Primary | ICD-10-CM

## 2024-11-12 DIAGNOSIS — Z01.84 LACK OF IMMUNITY TO HEPATITIS B VIRUS DEMONSTRATED BY SEROLOGIC TEST: ICD-10-CM

## 2024-11-12 DIAGNOSIS — I70.0 ATHEROSCLEROSIS OF AORTA (HCC): ICD-10-CM

## 2024-11-12 LAB
ALBUMIN SERPL BCG-MCNC: 4.4 G/DL (ref 3.5–5)
ALP SERPL-CCNC: 58 U/L (ref 34–104)
ALT SERPL W P-5'-P-CCNC: 19 U/L (ref 7–52)
ANION GAP SERPL CALCULATED.3IONS-SCNC: 8 MMOL/L (ref 4–13)
AST SERPL W P-5'-P-CCNC: 24 U/L (ref 13–39)
B-HCG SERPL-ACNC: <0.6 MIU/ML (ref 0–5)
BASOPHILS # BLD AUTO: 0.03 THOUSANDS/ÂΜL (ref 0–0.1)
BASOPHILS NFR BLD AUTO: 1 % (ref 0–1)
BILIRUB SERPL-MCNC: 0.4 MG/DL (ref 0.2–1)
BUN SERPL-MCNC: 12 MG/DL (ref 5–25)
CALCIUM SERPL-MCNC: 9 MG/DL (ref 8.4–10.2)
CHLORIDE SERPL-SCNC: 104 MMOL/L (ref 96–108)
CHOLEST SERPL-MCNC: 165 MG/DL
CO2 SERPL-SCNC: 28 MMOL/L (ref 21–32)
CREAT SERPL-MCNC: 0.74 MG/DL (ref 0.6–1.3)
EOSINOPHIL # BLD AUTO: 0.05 THOUSAND/ÂΜL (ref 0–0.61)
EOSINOPHIL NFR BLD AUTO: 1 % (ref 0–6)
ERYTHROCYTE [DISTWIDTH] IN BLOOD BY AUTOMATED COUNT: 12.1 % (ref 11.6–15.1)
GFR SERPL CREATININE-BSD FRML MDRD: 116 ML/MIN/1.73SQ M
GLUCOSE P FAST SERPL-MCNC: 86 MG/DL (ref 65–99)
HBV SURFACE AG SER QL: NORMAL
HCT VFR BLD AUTO: 38.8 % (ref 34.8–46.1)
HDLC SERPL-MCNC: 54 MG/DL
HGB BLD-MCNC: 12.4 G/DL (ref 11.5–15.4)
IMM GRANULOCYTES # BLD AUTO: 0.01 THOUSAND/UL (ref 0–0.2)
IMM GRANULOCYTES NFR BLD AUTO: 0 % (ref 0–2)
LDLC SERPL CALC-MCNC: 90 MG/DL (ref 0–100)
LYMPHOCYTES # BLD AUTO: 1.29 THOUSANDS/ÂΜL (ref 0.6–4.47)
LYMPHOCYTES NFR BLD AUTO: 32 % (ref 14–44)
MCH RBC QN AUTO: 30.2 PG (ref 26.8–34.3)
MCHC RBC AUTO-ENTMCNC: 32 G/DL (ref 31.4–37.4)
MCV RBC AUTO: 95 FL (ref 82–98)
MONOCYTES # BLD AUTO: 0.37 THOUSAND/ÂΜL (ref 0.17–1.22)
MONOCYTES NFR BLD AUTO: 9 % (ref 4–12)
NEUTROPHILS # BLD AUTO: 2.3 THOUSANDS/ÂΜL (ref 1.85–7.62)
NEUTS SEG NFR BLD AUTO: 57 % (ref 43–75)
NONHDLC SERPL-MCNC: 111 MG/DL
NRBC BLD AUTO-RTO: 0 /100 WBCS
PLATELET # BLD AUTO: 354 THOUSANDS/UL (ref 149–390)
PMV BLD AUTO: 10.3 FL (ref 8.9–12.7)
POTASSIUM SERPL-SCNC: 4.1 MMOL/L (ref 3.5–5.3)
PROT SERPL-MCNC: 6.9 G/DL (ref 6.4–8.4)
RBC # BLD AUTO: 4.1 MILLION/UL (ref 3.81–5.12)
SODIUM SERPL-SCNC: 140 MMOL/L (ref 135–147)
TRIGL SERPL-MCNC: 103 MG/DL
WBC # BLD AUTO: 4.05 THOUSAND/UL (ref 4.31–10.16)

## 2024-11-12 PROCEDURE — 97140 MANUAL THERAPY 1/> REGIONS: CPT | Performed by: PHYSICAL THERAPIST

## 2024-11-12 PROCEDURE — 87340 HEPATITIS B SURFACE AG IA: CPT

## 2024-11-12 PROCEDURE — 85025 COMPLETE CBC W/AUTO DIFF WBC: CPT

## 2024-11-12 PROCEDURE — 80053 COMPREHEN METABOLIC PANEL: CPT

## 2024-11-12 PROCEDURE — 80061 LIPID PANEL: CPT

## 2024-11-12 PROCEDURE — 36415 COLL VENOUS BLD VENIPUNCTURE: CPT

## 2024-11-12 PROCEDURE — 97110 THERAPEUTIC EXERCISES: CPT | Performed by: PHYSICAL THERAPIST

## 2024-11-12 PROCEDURE — 84702 CHORIONIC GONADOTROPIN TEST: CPT

## 2024-11-12 NOTE — PROGRESS NOTES
"Daily Note     Today's date: 2024  Patient name: Mindy Newman  : 2005  MRN: [id number]  Referring provider: Ethel Ferguson, *  Dx:   Encounter Diagnosis     ICD-10-CM    1. Left hip pain  M25.552                      Subjective: Pt felt good after last session. She feels even restriction with hip flexion.     Objective: See treatment diary below    Assessment: Pt tolerating manual well. Continued to progress to single leg and arm with core stability. Pt is challenged but not pain. Continue to progress as able.    Plan: Continue per plan of care.      Precautions: none      Manuals 10/15 10/22 10/24 10/29 10/31 11/4 11/7 11/12     Supine to long sit (+) ant rot (+) R ant rot (+) Rt ant rot (+) Rt ant rot (-) (+) reduce with shotgun MET       Ant rot MET FH 8x5'' FH 8x5'' FH 8x5'' MDD 8x5''  2 sets         Lt hip mobs   SIJ mobs SIJ mobs B/l hip mobs; SIJ mobs on Rt hip ext B/L hip MWM B/l hip mobs; SIJ mobs on Rt hip ext B/l hip mobs; SIJ mobs on Rt hip ext     Lt hip distraction             Laser  B/l SIJ & hip flexor 16W 4' ea SIJ B/l 16W 4'          Thoracic mobilization    Gr V mobilization to thoracic spine  (Measurements to inform intervention ) Gr 5 Grd 5 Gr5 Gr 5     Neuro Re-Ed                                                                                                        Ther Ex             TA contraction 10x10'' 10x10'' 10x10''          Iso abd crunch  5\" x 15    Pball press isometric 5\"x0       Front plank HEP W/ hip ext 10x ea           Side Plank HEP 30\"x3 ea  3x30'' Lt          Bear Crawl HEP            Dead bug Iso  5\" x 10 e  20x5'' + ball sq  20x5'' + ball sq 5\"2x10 2x10 2x10                  Bridge+ball sq HEP 5\" 2x10 20x5'' 20x5'' 20x5'' 20x5\" cues for glute activation Figure 4 bridge 20x5'' Figure 4 bridge 20x5''     Paloff Press   15x5'' black  15x5'' black 15x5\"black tb 15x5\"black tb 15x5'' black split stance     Renegade Row        2x6 10#     Bird/dogs  10x ea     " "       Thoracic rotations in kneeling     10x each side  10xea         Sh Ext+bosu step up        10xea black     Prone hip ext      Knee bent 5\"x10ea       Ther Activity             Upright bike  6' 8'  Elliptical 5' Elliptial 5' Elliptial 5' Elliptial 5'     Squats+press out             Leg Press             Gait Training                                       Modalities                                                    "

## 2024-11-13 ENCOUNTER — RESULTS FOLLOW-UP (OUTPATIENT)
Dept: FAMILY MEDICINE CLINIC | Facility: CLINIC | Age: 19
End: 2024-11-13

## 2024-11-14 ENCOUNTER — APPOINTMENT (OUTPATIENT)
Dept: PHYSICAL THERAPY | Facility: CLINIC | Age: 19
End: 2024-11-14
Payer: COMMERCIAL

## 2024-11-14 NOTE — PROGRESS NOTES
"Daily Note     Today's date: 2024  Patient name: Mindy Newman  : 2005  MRN: [id number]  Referring provider: Ethel Ferguson, *  Dx: No diagnosis found.               Subjective: ***      Objective: See treatment diary below      Assessment: Patient tolerated treatment session well.       Plan: Continue per POC. Increase reps/resistance as tolerated.      Precautions: none      Manuals 10/15 10/22 10/24 10/29 10/31 11/4 11/7 11/12 11/14    Supine to long sit (+) ant rot (+) R ant rot (+) Rt ant rot (+) Rt ant rot (-) (+) reduce with shotgun MET       Ant rot MET FH 8x5'' FH 8x5'' FH 8x5'' MDD 8x5''  2 sets         Lt hip mobs   SIJ mobs SIJ mobs B/l hip mobs; SIJ mobs on Rt hip ext B/L hip MWM B/l hip mobs; SIJ mobs on Rt hip ext B/l hip mobs; SIJ mobs on Rt hip ext     Lt hip distraction             Laser  B/l SIJ & hip flexor 16W 4' ea SIJ B/l 16W 4'          Thoracic mobilization    Gr V mobilization to thoracic spine  (Measurements to inform intervention ) Gr 5 Grd 5 Gr5 Gr 5     Neuro Re-Ed                                                                                                        Ther Ex             TA contraction 10x10'' 10x10'' 10x10''          Iso abd crunch  5\" x 15    Pball press isometric 5\"x0       Front plank HEP W/ hip ext 10x ea           Side Plank HEP 30\"x3 ea  3x30'' Lt          Bear Crawl HEP            Dead bug Iso  5\" x 10 e  20x5'' + ball sq  20x5'' + ball sq 5\"2x10 2x10 2x10                  Bridge+ball sq HEP 5\" 2x10 20x5'' 20x5'' 20x5'' 20x5\" cues for glute activation Figure 4 bridge 20x5'' Figure 4 bridge 20x5''     Paloff Press   15x5'' black  15x5'' black 15x5\"black tb 15x5\"black tb 15x5'' black split stance     Renegade Row        2x6 10#     Bird/dogs  10x ea            Thoracic rotations in kneeling     10x each side  10xea         Sh Ext+bosu step up        10xea black     Prone hip ext      Knee bent 5\"x10ea       Ther Activity             Upright bike "  6' 8'  Elliptical 5' Elliptial 5' Elliptial 5' Elliptial 5'     Squats+press out             Leg Press             Gait Training                                       Modalities

## 2024-11-21 ENCOUNTER — OFFICE VISIT (OUTPATIENT)
Dept: PHYSICAL THERAPY | Facility: CLINIC | Age: 19
End: 2024-11-21
Payer: COMMERCIAL

## 2024-11-21 DIAGNOSIS — M25.552 LEFT HIP PAIN: Primary | ICD-10-CM

## 2024-11-21 PROCEDURE — 97110 THERAPEUTIC EXERCISES: CPT

## 2024-11-21 PROCEDURE — 97140 MANUAL THERAPY 1/> REGIONS: CPT

## 2024-11-21 NOTE — PROGRESS NOTES
"Daily Note     Today's date: 2024  Patient name: Mindy Newman  : 2005  MRN: [id number]  Referring provider: Ethel Ferguson, *  Dx:   Encounter Diagnosis     ICD-10-CM    1. Left hip pain  M25.552           Start Time: 1703  Stop Time: 1750  Total time in clinic (min): 47 minutes    Subjective: Patient reports some worsening of symptoms since taking a week off from PT. She states some more sciatic nerve irritation and radiation down Lt leg. She also mentions hips feeling \"unstable\" with certain movements.       Objective: See treatment diary below      Assessment: Spent increased time on manual interventions d/t subjective reports. LORIE CloudT assessed patient's strength/mobility and noted a persistent up slip. She provided patient with education of stretching regimen and to work on core strengthening. Continue to progress as able.       Plan: Continue per POC. Increase reps/resistance as tolerated.      Precautions: none      Manuals 10/15 10/22 10/24 10/29 10/31 11/4 11/7 11/12 11/21    Supine to long sit (+) ant rot (+) R ant rot (+) Rt ant rot (+) Rt ant rot (-) (+) reduce with shotgun MET       Ant rot MET FH 8x5'' FH 8x5'' FH 8x5'' MDD 8x5''  2 sets         Lt hip mobs   SIJ mobs SIJ mobs B/l hip mobs; SIJ mobs on Rt hip ext B/L hip MWM B/l hip mobs; SIJ mobs on Rt hip ext B/l hip mobs; SIJ mobs on Rt hip ext     Lt hip distraction             Laser  B/l SIJ & hip flexor 16W 4' ea SIJ B/l 16W 4'          Thoracic mobilization    Gr V mobilization to thoracic spine  (Measurements to inform intervention ) Gr 5 Grd 5 Gr5 Gr 5     Rt Up slip HVT         KT                 Neuro Re-Ed                                                                                                        Ther Ex             TA contraction 10x10'' 10x10'' 10x10''          Iso abd crunch  5\" x 15    Pball press isometric 5\"x0       Front plank HEP W/ hip ext 10x ea           Side Plank HEP 30\"x3 ea  3x30'' " "Lt          Bear Crawl HEP            Dead bug Iso  5\" x 10 e  20x5'' + ball sq  20x5'' + ball sq 5\"2x10 2x10 2x10                  Bridge+ball sq HEP 5\" 2x10 20x5'' 20x5'' 20x5'' 20x5\" cues for glute activation Figure 4 bridge 20x5'' Figure 4 bridge 20x5''     Paloff Press   15x5'' black  15x5'' black 15x5\"black tb 15x5\"black tb 15x5'' black split stance     Renegade Row        2x6 10#     Bird/dogs  10x ea            Thoracic rotations in kneeling     10x each side  10xea         Sh Ext+bosu step up        10xea black     Prone hip ext      Knee bent 5\"x10ea       Sciatic nerve glides         10x ea     Ther Activity             Upright bike  6' 8'  Elliptical 5' Elliptial 5' Elliptial 5' Elliptial 5' Elliptial 5'    Squats+press out             Leg Press             Gait Training                                       Modalities                                                        " No

## 2024-12-03 ENCOUNTER — OFFICE VISIT (OUTPATIENT)
Dept: PHYSICAL THERAPY | Facility: CLINIC | Age: 19
End: 2024-12-03
Payer: COMMERCIAL

## 2024-12-03 DIAGNOSIS — M25.552 LEFT HIP PAIN: Primary | ICD-10-CM

## 2024-12-03 PROCEDURE — 97140 MANUAL THERAPY 1/> REGIONS: CPT | Performed by: PHYSICAL THERAPIST

## 2024-12-03 PROCEDURE — 97110 THERAPEUTIC EXERCISES: CPT | Performed by: PHYSICAL THERAPIST

## 2024-12-03 NOTE — PROGRESS NOTES
"Daily Note     Today's date: 12/3/2024  Patient name: Mindy Newman  : 2005  MRN: [id number]  Referring provider: Ethel Ferguson, *  Dx:   Encounter Diagnosis     ICD-10-CM    1. Left hip pain  M25.552                      Subjective: Pt reports yesterday her hip felt very painful but she had a family PT help her last week and gave her a heel lift for a LLD.      Objective: See treatment diary below      Assessment: Discussed heel lifts and looking into imaging regarding chronic Rt upslip. Pt benfits from manual but zero carryover and has persistent upslip each session. Pt tolerating exercises well. Encouraged core and hip stability exercises at home.      Plan: Continue per plan of care.      Precautions: none      Manuals 10/15 10/22 10/24 10/29 10/31 11/4 11/7 11/12 11/21 12/3   Supine to long sit (+) ant rot (+) R ant rot (+) Rt ant rot (+) Rt ant rot (-) (+) reduce with shotgun MET       Ant rot MET FH 8x5'' FH 8x5'' FH 8x5'' MDD 8x5''  2 sets         Lt hip mobs   SIJ mobs SIJ mobs B/l hip mobs; SIJ mobs on Rt hip ext B/L hip MWM B/l hip mobs; SIJ mobs on Rt hip ext B/l hip mobs; SIJ mobs on Rt hip ext     Lt hip distraction             Laser  B/l SIJ & hip flexor 16W 4' ea SIJ B/l 16W 4'          Thoracic mobilization    Gr V mobilization to thoracic spine  (Measurements to inform intervention ) Gr 5 Grd 5 Gr5 Gr 5     Rt Up slip HVT         KT FH                Neuro Re-Ed                                                                                                        Ther Ex             TA contraction 10x10'' 10x10'' 10x10''          Iso abd crunch  5\" x 15    Pball press isometric 5\"x0       Front plank HEP W/ hip ext 10x ea           Side Plank HEP 30\"x3 ea  3x30'' Lt          Bear Crawl HEP            Dead bug Iso  5\" x 10 e  20x5'' + ball sq  20x5'' + ball sq 5\"2x10 2x10 2x10                  Bridge+ball sq HEP 5\" 2x10 20x5'' 20x5'' 20x5'' 20x5\" cues for glute activation Figure 4 " "bridge 20x5'' Figure 4 bridge 20x5'' Bridge 10x5'' Bridge 10x5''   Paloff Press   15x5'' black  15x5'' black 15x5\"black tb 15x5\"black tb 15x5'' black split stance  15x5'' black split stance   Renegade Row        2x6 10#  3x6 10#   Bird/dogs  10x ea            Thoracic rotations in kneeling     10x each side  10xea         Sh Ext+bosu step up        10xea black     Prone hip ext      Knee bent 5\"x10ea       Sciatic nerve glides         10x ea     Ther Activity             Upright bike  6' 8'  Elliptical 5' Elliptial 5' Elliptial 5' Elliptial 5' Elliptial 5' Elliptical 5'   Squats+press out          2x10 10#   Leg Press             Gait Training                                       Modalities                                                          "

## 2024-12-05 ENCOUNTER — OFFICE VISIT (OUTPATIENT)
Dept: PHYSICAL THERAPY | Facility: CLINIC | Age: 19
End: 2024-12-05
Payer: COMMERCIAL

## 2024-12-05 DIAGNOSIS — M25.552 LEFT HIP PAIN: Primary | ICD-10-CM

## 2024-12-05 PROCEDURE — 97110 THERAPEUTIC EXERCISES: CPT

## 2024-12-05 NOTE — PROGRESS NOTES
"Daily Note     Today's date: 2024  Patient name: Mindy Newman  : 2005  MRN: [id number]  Referring provider: Ethel Ferguson, *  Dx:   Encounter Diagnosis     ICD-10-CM    1. Left hip pain  M25.552           Start Time: 1705  Stop Time: 1750  Total time in clinic (min): 45 minutes    Subjective: Patient reports her symptoms have improved since last visit.       Objective: See treatment diary below      Assessment: Patient tolerated treatment session well and is doing well with core stabilization program. Patient demonstrated adequate form with all outlined strengthening without an onset of increased pain/discomfort. Patient demonstrated fatigue post-tx and would benefit from continued PT to improve level of function.       Plan: Continue per POC. Increase reps/resistance as tolerated.      Precautions: none      Manuals 10/15 10/22 10/24 10/29 10/31 11/4 11/7 11/12 11/21 12/3 12/5   Supine to long sit (+) ant rot (+) R ant rot (+) Rt ant rot (+) Rt ant rot (-) (+) reduce with shotgun MET        Ant rot MET FH 8x5'' FH 8x5'' FH 8x5'' MDD 8x5''  2 sets          Lt hip mobs   SIJ mobs SIJ mobs B/l hip mobs; SIJ mobs on Rt hip ext B/L hip MWM B/l hip mobs; SIJ mobs on Rt hip ext B/l hip mobs; SIJ mobs on Rt hip ext      Lt hip distraction              Laser  B/l SIJ & hip flexor 16W 4' ea SIJ B/l 16W 4'           Thoracic mobilization    Gr V mobilization to thoracic spine  (Measurements to inform intervention ) Gr 5 Grd 5 Gr5 Gr 5      Rt Up slip HVT         KT FH FH                 Neuro Re-Ed                                                                                                                Ther Ex              TA contraction 10x10'' 10x10'' 10x10''           Iso abd crunch  5\" x 15    Pball press isometric 5\"x0        Front plank HEP W/ hip ext 10x ea            Side Plank HEP 30\"x3 ea  3x30'' Lt           Bear Crawl HEP             Dead bug Iso  5\" x 10 e  20x5'' + ball sq  20x5'' + " "ball sq 5\"2x10 2x10 2x10   2x10                 Bridge+ball sq HEP 5\" 2x10 20x5'' 20x5'' 20x5'' 20x5\" cues for glute activation Figure 4 bridge 20x5'' Figure 4 bridge 20x5'' Bridge 10x5'' Bridge 10x5'' Figure 4 bridge 20x5''   Paloff Press   15x5'' black  15x5'' black 15x5\"black tb 15x5\"black tb 15x5'' black split stance  15x5'' black split stance 15x5'' black split stance   Renegade Row        2x6 10#  3x6 10# 3x6 10#   Bird/dogs  10x ea             Thoracic rotations in kneeling     10x each side  10xea          Sh Ext+bosu step up        10xea black   2x10 ea black   Prone hip ext      Knee bent 5\"x10ea        Sciatic nerve glides         10x ea      Ther Activity              Upright bike  6' 8'  Elliptical 5' Elliptial 5' Elliptial 5' Elliptial 5' Elliptial 5' Elliptical 5' Upright L2x8'   Squats+press out          2x10 10# 2x10 10#   Leg Press              Gait Training                                          Modalities                                                               "

## 2024-12-10 ENCOUNTER — OFFICE VISIT (OUTPATIENT)
Dept: PHYSICAL THERAPY | Facility: CLINIC | Age: 19
End: 2024-12-10
Payer: COMMERCIAL

## 2024-12-10 DIAGNOSIS — M25.552 LEFT HIP PAIN: Primary | ICD-10-CM

## 2024-12-10 PROCEDURE — 97110 THERAPEUTIC EXERCISES: CPT | Performed by: PHYSICAL THERAPIST

## 2024-12-10 NOTE — PROGRESS NOTES
"Daily Note     Today's date: 12/10/2024  Patient name: Mindy Newman  : 2005  MRN: [id number]  Referring provider: Ethel Ferguson, *  Dx:   Encounter Diagnosis     ICD-10-CM    1. Left hip pain  M25.552                      Subjective: Pt reports she is feeling pretty good. She tried squatting yesterday with only some back discomfort.      Objective: See treatment diary below      Assessment: Pt is tolerating exercises well. Educated on traction for Rt LE for upslip. Educated to continue core stability and strengthening.       Plan: Discharge PT services.     Precautions: none      Manuals 12/10    10/31 11/4 11/7 11/12 11/21 12/3 12/5   Supine to long sit     (-) (+) reduce with shotgun MET        Ant rot MET              Lt hip mobs     B/l hip mobs; SIJ mobs on Rt hip ext B/L hip MWM B/l hip mobs; SIJ mobs on Rt hip ext B/l hip mobs; SIJ mobs on Rt hip ext      Lt hip distraction              Laser              Thoracic mobilization     Gr 5 Grd 5 Gr5 Gr 5      Rt Up slip HVT FH rev        KT FH FH                 Neuro Re-Ed                                                                                                                Ther Ex              TA contraction              Iso abd crunch      Pball press isometric 5\"x0        Front plank              Side Plank              Bear Crawl              Dead bug Iso 2x10    20x5'' + ball sq 5\"2x10 2x10 2x10   2x10                 Bridge+ball sq Figure 4 bridge 20x5''    20x5'' 20x5\" cues for glute activation Figure 4 bridge 20x5'' Figure 4 bridge 20x5'' Bridge 10x5'' Bridge 10x5'' Figure 4 bridge 20x5''   Paloff Press 15x5'' black split stance    15x5'' black 15x5\"black tb 15x5\"black tb 15x5'' black split stance  15x5'' black split stance 15x5'' black split stance   Renegade Row 3x6 10#       2x6 10#  3x6 10# 3x6 10#   Bird/dogs              Thoracic rotations in kneeling      10xea          Sh Ext+bosu step up        10xea black   2x10 ea " "black   Prone hip ext      Knee bent 5\"x10ea        Sciatic nerve glides         10x ea      Ther Activity              Upright bike   8'  Elliptical 5' Elliptial 5' Elliptial 5' Elliptial 5' Elliptial 5' Elliptical 5' Upright L2x8'   Squats+press out 2x10 10#         2x10 10# 2x10 10#   Leg Press              Gait Training                                          Modalities                                                                 "

## 2024-12-12 ENCOUNTER — APPOINTMENT (OUTPATIENT)
Dept: PHYSICAL THERAPY | Facility: CLINIC | Age: 19
End: 2024-12-12
Payer: COMMERCIAL

## 2025-02-26 ENCOUNTER — CLINICAL SUPPORT (OUTPATIENT)
Dept: FAMILY MEDICINE CLINIC | Facility: CLINIC | Age: 20
End: 2025-02-26
Payer: COMMERCIAL

## 2025-02-26 DIAGNOSIS — Z23 ENCOUNTER FOR IMMUNIZATION: Primary | ICD-10-CM

## 2025-02-26 PROCEDURE — 90471 IMMUNIZATION ADMIN: CPT

## 2025-02-26 PROCEDURE — 90746 HEPB VACCINE 3 DOSE ADULT IM: CPT

## 2025-04-23 ENCOUNTER — TELEPHONE (OUTPATIENT)
Age: 20
End: 2025-04-23

## 2025-04-23 ENCOUNTER — OFFICE VISIT (OUTPATIENT)
Dept: OBGYN CLINIC | Facility: CLINIC | Age: 20
End: 2025-04-23
Payer: COMMERCIAL

## 2025-04-23 DIAGNOSIS — M25.551 PAIN IN RIGHT HIP: ICD-10-CM

## 2025-04-23 DIAGNOSIS — M25.552 PAIN IN LEFT HIP: Primary | ICD-10-CM

## 2025-04-23 PROCEDURE — 99205 OFFICE O/P NEW HI 60 MIN: CPT | Performed by: STUDENT IN AN ORGANIZED HEALTH CARE EDUCATION/TRAINING PROGRAM

## 2025-04-23 NOTE — TELEPHONE ENCOUNTER
Caller: Mindy     Doctor: Dr. Georges    Reason for call: patient was calling o schedule an appt with Dr. Georges. Tried to transfer to [facility] but office was closed- provided patient with phone number     Call back#: [phone]

## 2025-04-23 NOTE — PROGRESS NOTES
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 MALONE  Referral was placed for Dr. Sevilla, possible piriformis etiology or radicular spine pain.  She does have 1 small bulging disc at L4/L5 however her symptoms are more consistent with hip pathology.  I would recommend she see Dr. MALONE first and then proceed with an opinion from Dr. Sevilla for any nonoperative injection treatment if Dr. MALONE feels this is the best route  OTC pain medication as needed   Follow up as needed     Orders:    Ambulatory Referral to Orthopedic Surgery; Future        Procedures  No procedures today.    Translation: No    HISTORY:  20 y.o. female  who is present for evaluation of her left hip pain. She previously had MRI which images are not present but reviewed interpretation. She notes that she believes she endured injury back in 2018 while sliding into [facility] [employer]. She notes that she has pain in the posterior hip, deep in hip joint, and into the groin that is present when active. She notes that she has been experiencing some diminished sensation of the LLE with occasional sharp shooting pain and tingling. She notes that overall she has done extensive physical therapy without relief, she had previous US guided injection with only couple hours of relief, chiropractor. She also had an EMG and rheumatology workup which was negative.     Surgical History:  None      The following portions of the patient's history were reviewed and updated as  "appropriate: allergies, current medications, past family history, past social history, past surgical history and problem list.    There were no vitals taken for this visit.   No results found for: \"HGBA1C\"      Past Medical History:   Diagnosis Date    Anxiety 03/2022    School counselor    Disease of thyroid gland     Hypothyroidism        Past Surgical History:   Procedure Laterality Date    WISDOM TOOTH EXTRACTION         Social History     Socioeconomic History    Marital status: Single     Spouse name: Not on file    Number of children: Not on file    Years of education: Not on file    Highest education level: Not on file   Occupational History    Not on file   Tobacco Use    Smoking status: Never    Smokeless tobacco: Never   Vaping Use    Vaping status: Never Used   Substance and Sexual Activity    Alcohol use: Yes     Alcohol/week: 2.0 standard drinks of alcohol     Types: 2 Standard drinks or equivalent per week     Comment: social    Drug use: Never    Sexual activity: Not Currently     Partners: Male     Birth control/protection: Abstinence     Comment: In the past used OCP   Other Topics Concern    Not on file   Social History Narrative    Not on file     Social Drivers of Health     Financial Resource Strain: Not on file   Food Insecurity: Not on file   Transportation Needs: Not on file   Physical Activity: Not on file   Stress: Not on file   Social Connections: Not on file   Intimate Partner Violence: Not on file   Housing Stability: Not on file       Family History   Problem Relation Age of Onset    Hypertension Mother     No Known Problems Father     Cancer Maternal Grandmother     Heart disease Maternal Grandfather     Heart disease Paternal Grandmother     Breast cancer Paternal Grandmother     Cancer Paternal Grandmother     Heart disease Paternal Grandfather         Patient's Medications   New Prescriptions    No medications on file   Previous Medications    DULOXETINE (CYMBALTA) 30 MG DELAYED " RELEASE CAPSULE    Take 30 mg by mouth 2 (two) times a day    EPINEPHRINE (EPIPEN) 0.3 MG/0.3 ML SOAJ    Inject 0.3 mL (0.3 mg total) into a muscle once for 1 dose    MULTIVITAMIN (THERAGRAN) TABS    Take 1 tablet by mouth daily   Modified Medications    No medications on file   Discontinued Medications    ISOTRETINOIN (ACCUTANE) 30 MG CAPSULE    Take 30 mg by mouth 2 (two) times a day       Allergies   Allergen Reactions    Fish Allergy - Food Allergy Itching, Swelling, Throat Swelling and Tongue Swelling    Shellfish Allergy - Food Allergy Cough, Itching, Swelling, Throat Swelling and Tongue Swelling          REVIEW OF SYSTEMS:  Constitutional: Negative.    HEENT: Negative.    Respiratory: Negative.    Skin: Negative.    Neurological: Negative.    Psychiatric/Behavioral: Negative.  Musculoskeletal: Negative except for that mentioned in the HPI.    Gen: No acute distress, resting comfortably in bed  HEENT: Eyes clear, moist mucus membranes, hearing intact  Respiratory: No audible wheezing or stridor  Cardiovascular: Well Perfused peripherally, 2+ distal pulse  Abdomen: nondistended, no peritoneal signs     PHYSICAL EXAM:      Left Hip Exam  Visual inspection of the hip demonstrates no deformity  The patient has a positive C sign during explanation of hip pain  She ambulates without an antalgic gait  She has hip flexion to 120 degrees, full hip extension, hip internal rotation approximately 45 degrees, hip external Tatian is 75 degrees  She has a positive FADDIR, and a negative Merlyn  She has negative Stinchfield test negative Simon's testing during today's exam  She has no tenderness palpation over the psoas  She has no tenderness palpation over the rectus abdominis and no palpable hernias  She has mild but present tenderness palpation over the greater trochanter and gluteal region.  She has intact gluteal testing 5 out of 5 to glute medius, minimus, sally  She has negative Payton's test and no significant IT  "band pathology on examination    MR arthrogram from 2022 report was reviewed which demonstrates a possible chondral labral detachment of the anterior superior labrum.  I was not able to view the images.  The report does not mention any evidence of a cam lesion or pincer pathology.  The center edge angle was not mentioned over measured.  Radiographic report demonstrates report of no acute degenerative changes however does not mention any findings consistent with ANN.    Prior EMG was reviewed and demonstrates no evidence of any peripheral nerve compression    MRI of the lumbar spine from 2023 demonstrates L4 disc bulge without significant retropulsion or central/foraminal stenosis  The remainder of the lumbar spine is relatively unremarkable    Prior office visits from [facility] orthopedics including visits with orthopedic surgeon, orthopedic hip specialist, physiatrist, rheumatologist, and physical therapy all were reviewed.      Mary Beth James PA-C    Scribe Attestation      I,:  Mary Beth James PA-C am acting as a scribe while in the presence of the attending physician.:       I,:  Stuart Longoria, DO personally performed the services described in this documentation    as scribed in my presence.:               Portions of the record may have been created with voice recognition software.  Occasional wrong word or \"sound a like\" substitutions may have occurred due to the inherent limitations of voice recognition software.  Read the chart carefully and recognize, using context, where substitutions have occurred. All patient's questions were answered to their satisfaction.    "

## 2025-05-12 ENCOUNTER — APPOINTMENT (OUTPATIENT)
Dept: LAB | Facility: CLINIC | Age: 20
End: 2025-05-12
Payer: COMMERCIAL

## 2025-05-12 LAB — TSH SERPL DL<=0.05 MIU/L-ACNC: 1.53 UIU/ML (ref 0.45–4.5)

## 2025-05-13 LAB
ALMOND IGE QN: <0.1 KUA/I (ref 0–0.1)
CASHEW NUT IGE QN: <0.1 KUA/I (ref 0–0.1)
CODFISH IGE QN: 3.73 KUA/I (ref 0–0.1)
EGG WHITE IGE QN: 0.13 KUA/I (ref 0–0.1)
ENDOMYSIUM IGA SER QL: NEGATIVE
GLIADIN PEPTIDE IGA SER-ACNC: 5 UNITS (ref 0–19)
GLIADIN PEPTIDE IGG SER-ACNC: 2 UNITS (ref 0–19)
GLUTEN IGE QN: <0.1 KUA/I (ref 0–0.1)
HAZELNUT IGE QN: <0.1 KUA/L (ref 0–0.1)
IGA SERPL-MCNC: 97 MG/DL (ref 87–352)
MILK IGE QN: <0.1 KUA/I (ref 0–0.1)
OVALB IGE QN: 0.1 KAU/I (ref 0–0.1)
OVOMUCOID IGE QN: <0.1 KAU/I (ref 0–0.1)
PEANUT IGE QN: <0.1 KUA/I (ref 0–0.1)
SALMON IGE QN: 1.75 KUA/I (ref 0–0.1)
SCALLOP IGE QN: 0.93 KUA/L (ref 0–0.1)
SESAME SEED IGE QN: <0.1 KUA/I (ref 0–0.1)
SHRIMP IGE QN: 2.76 KUA/L (ref 0–0.1)
SOYBEAN IGE QN: <0.1 KUA/I (ref 0–0.1)
TOTAL IGE SMQN RAST: 133 KU/L (ref 0–113)
TTG IGA SER-ACNC: <2 U/ML (ref 0–3)
TTG IGG SER-ACNC: 8 U/ML (ref 0–5)
TUNA IGE QN: 0.83 KUA/I (ref 0–0.1)
WALNUT IGE QN: <0.1 KUA/I (ref 0–0.1)
WHEAT IGE QN: <0.1 KUA/I (ref 0–0.1)

## 2025-05-14 ENCOUNTER — RESULTS FOLLOW-UP (OUTPATIENT)
Dept: FAMILY MEDICINE CLINIC | Facility: CLINIC | Age: 20
End: 2025-05-14

## 2025-05-15 DIAGNOSIS — R14.0 ABDOMINAL BLOATING: Primary | ICD-10-CM

## 2025-05-15 DIAGNOSIS — R76.8 POSITIVE AUTOANTIBODY SCREENING FOR CELIAC DISEASE: ICD-10-CM

## 2025-05-16 ENCOUNTER — HOSPITAL ENCOUNTER (OUTPATIENT)
Dept: RADIOLOGY | Facility: HOSPITAL | Age: 20
End: 2025-05-16
Attending: STUDENT IN AN ORGANIZED HEALTH CARE EDUCATION/TRAINING PROGRAM
Payer: COMMERCIAL

## 2025-05-16 ENCOUNTER — OFFICE VISIT (OUTPATIENT)
Dept: OBGYN CLINIC | Facility: CLINIC | Age: 20
End: 2025-05-16
Payer: COMMERCIAL

## 2025-05-16 VITALS — BODY MASS INDEX: 24.08 KG/M2 | WEIGHT: 153.4 LBS | HEIGHT: 67 IN

## 2025-05-16 DIAGNOSIS — M25.852 FEMOROACETABULAR IMPINGEMENT OF LEFT HIP: ICD-10-CM

## 2025-05-16 DIAGNOSIS — S73.192A TEAR OF LEFT ACETABULAR LABRUM, INITIAL ENCOUNTER: Primary | ICD-10-CM

## 2025-05-16 DIAGNOSIS — M25.552 PAIN IN LEFT HIP: ICD-10-CM

## 2025-05-16 DIAGNOSIS — M21.70 ACQUIRED UNEQUAL LIMB LENGTH: ICD-10-CM

## 2025-05-16 PROCEDURE — 99214 OFFICE O/P EST MOD 30 MIN: CPT | Performed by: STUDENT IN AN ORGANIZED HEALTH CARE EDUCATION/TRAINING PROGRAM

## 2025-05-16 PROCEDURE — 73502 X-RAY EXAM HIP UNI 2-3 VIEWS: CPT

## 2025-05-16 PROCEDURE — 77073 BONE LENGTH STUDIES: CPT

## 2025-05-16 NOTE — PROGRESS NOTES
Ortho Sports Medicine New Patient Visit     Assesment:   20 y.o. female with left hip pain and complex picture, possible femoroacetabular impingement and labral tear in the setting of possible piriformis syndrome / lumbar spine etiology.    Plan:    I had a lengthy discussion with Mindy during which we reviewed her imaging, prior workup and exam and history, and her possible diagnoses as well as treatment options. We discussed non-surgical as well as surgical treatment options. I discussed that while she does have anterior hip / groin pain as well as history and exam consistent with ANN and a labral tear, her gluteal pain and numbness and tingling was less likely to be stemming from this. She has completed extensive non-surgical treatments including multiple rounds of physical therapy, activity modification, NSAIDS and other prescription medications including Cymbalta, and a steroid injection into her hip, however she continues to have significant symptoms that are affecting her function and quality of life. I did discuss that the first step, prior to pursuing any surgical treatments, would to be obtain more information, specifically to obtain a repeat MRI of her left hip given that her last MRI was in 2022. I also did recommend that she follow up with a spine and pain specialist, and she does already have an appointment with Dr. Sevilla and will be evaluated for possible piriformis vs lumbar epidural injection at that time. I discussed the possibility of an intra articular hip injection and I told her that I would prefer to do this myself for diagnostic purposes so that I could re-examine and re-assess her pain, however I would like to wait to see how she responds to other injections (piriformis / spinal) first since she has already had a hip injection, to minimize any potential chondrotoxic effects. She will follow up with me after the MRI of her hip, possibly via tele health depending on if she's at home in [facility], and after she sees Dr. Sevilla. All questions answered. We also discussed modified shoe lift to specifically address her 1 CM LLD (L>R) and can consider formal referral again to orthotics at future visit. If she completes MRI at outside facility, I emphasized that it has to be a 3 Renita MRI.    1. Tear of left acetabular labrum, initial encounter  -     MRI hip left wo contrast; Future; Expected date: 05/16/2025  2. Pain in left hip  -     XR hip/pelv 2-3 vws left if performed; Future; Expected date: 05/16/2025  -     XR bone length (scanogram); Future; Expected date: 05/16/2025  3. Acquired unequal limb length  4. Femoroacetabular impingement of left hip        Follow up:    Follow up after MRI        Chief Complaint   Patient presents with    Left Hip - Pain       History of Present Illness:    The patient is a 20 y.o. female who presents for left hip pain.  She is referred by Mary Beth James and Dr. Longoria for consultation of her left hip.  She has an extensive history with regards to this hip.  In brief, she played softball in high school and in 2018 slid into third base and had immediate pain and felt immediate injury to her left hip.  She initially had an x-ray which was negative, so she treated this nonsurgically with RICE.  In follow-up 2021, she continued to have significant pain and symptoms affecting her function and she was no longer able to play softball.  [facility] presumed femoral acetabular impingement.  In the spring 2022, she had pain in her hip as well as her back and audible clicking in her left hip every time she stood, and so she worked with a chiropractor which gave some short-term relief.  In the summer 2022, she continued to do physical therapy however continued to have pain and also had some radicular pain radiating down her leg to her foot along with some associated anterior thigh numbness.  She did see multiple specialist at this time  and had an MRI of the lumbar spine which showed an L4-5 disc bulge and degenerative disc disease at various levels but no significant stenosis, and she had a MRI of her left hip which showed a possible partial labral tear.  She had an EMG completed to rule out multiple sclerosis.  In fall 2022 she was diagnosed with hypothyroidism after experiencing cold intolerance and weight gain.  She saw Dr. Weaver at [facility] who said that this was related to her back, however she continues to have issues despite changing her routine to bead Button and working at  and with a chiropractor.  In the winter 2023, she had a cortisone injection into her left hip with Dr. Araujo at [facility] she also saw Dr. Rivera, spine physician at [facility] who prescribed Cymbalta.  She had an MRI of her neck which was unremarkable.  In the spring 2024, she saw physical therapies and she was having tingling nerve pain from her right knee to ankle with decree sensation mobility.  She also saw a rheumatologist who felt that all of her rheumatologic testing was negative.  In the summer 2024 she had a repeat upper and lower extremity EMG which only showed right arm denervation but this was an incomplete EMG of the lower extremities reportedly.  She saw Dr. SILVA at [facility] who said that part of her issue was related to anterior pelvic tilt.  He did feel that her groin pain was from her hip while her back and gluteal pain was from the back.  She has worked with physical therapy on addressing her anterior pelvic tilt.    At this time, her primary symptoms are anterior groin pain as well as radiating pain that starts in her gluteal area and radiates down the back of her leg along with associated numbness and tingling.  The pain is worse with prolonged sitting.  She also has pain when she tries to run and she no longer runs.  She is currently very active with walking and she walks about 30 minutes a day.  She also regularly does resistance training.  She  does have clicking and catching as well as popping inside her left hip. She also notes that her LLE feels longer than her RLE.      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    Disease of thyroid gland     Hypothyroidism      Past Surgical History:   Procedure Laterality Date    WISDOM TOOTH EXTRACTION       Allergies[1]  Medications Ordered Prior to Encounter[2]  Social History     Socioeconomic History    Marital status: Single     Spouse name: Not on file    Number of children: Not on file    Years of education: Not on file    Highest education level: Not on file   Occupational History    Not on file   Tobacco Use    Smoking status: Never    Smokeless tobacco: Never   Vaping Use    Vaping status: Never Used   Substance and Sexual Activity    Alcohol use: Yes     Alcohol/week: 2.0 standard drinks of alcohol     Types: 2 Standard drinks or equivalent per week     Comment: social    Drug use: Never    Sexual activity: Not Currently     Partners: Male     Birth control/protection: Abstinence     Comment: In the past used OCP   Other Topics Concern    Not on file   Social History Narrative    Not on file     Social Drivers of Health     Financial Resource Strain: Not on file   Food Insecurity: Not on file   Transportation Needs: Not on file   Physical Activity: Not on file   Stress: Not on file   Social Connections: Not on file   Intimate Partner Violence: Not on file   Housing Stability: Not on file         I have reviewed the past medical, surgical, social and family history, medications and allergies as documented in the EMR.    Review of systems: ROS is negative other than that noted in the HPI.  Constitutional: Negative for fatigue and fever.   HENT: Negative for sore throat.    Respiratory: Negative for shortness of breath.    Cardiovascular: Negative for chest pain.   Gastrointestinal: Negative for  "abdominal pain.   Endocrine: Negative for cold intolerance and heat intolerance.   Genitourinary: Negative for flank pain.        Physical Exam:    Height 5' 6.5\" (1.689 m), weight 69.6 kg (153 lb 6.4 oz).    General/Constitutional: NAD, well developed, well nourished        Left Hip Exam:   No skin lesions or significant deformity   Palpation: NTTP over greater trochanter  ROM : 120 of flexion, 30 of IR, 60 of ER, audible pop with FADIR  Negative straight leg raise   Positive Stinchfield  FADIR: Positive  HUMPHREY: Negative    Antalgic gait  No pain with forward or lateral lumbar bend    Neurovascularly intact distally except for reduced sensation over left anterior thigh compared to contralateral side      Hip Imaging    I personally reviewed and interpreted 3 x-rays including AP pelvis, as well as AP and modified Alex lateral of the affected hip which were obtained in the office today and reviewed in detail with the patient. There are no degenerative changes in the hip joint. No acute fracture or dislocation. No other bony pathology is present. There is a cam deformity. There is not a pincer deformity.    LCEA: 27  Alpha angle: 67  Tonnis angle: 8    I personally reviewed and interpreted MR arthrogram of the left hip obtained in 2022 which demonstrates possible anterosuperior partial labral tear with some adjacent possible chondral delamination. No loose bodies or stress fracture or evidence of AVN. No significant iliopsoas tendinitis. I reviewed the radiology report and agree with their findings.    I reviewed scanogram which shows that LLE is 11 mm longer than RLE measured from top of femoral head to the talus.       [1]   Allergies  Allergen Reactions    Fish Allergy - Food Allergy Itching, Swelling, Throat Swelling and Tongue Swelling    Shellfish Allergy - Food Allergy Cough, Itching, Swelling, Throat Swelling and Tongue Swelling   [2]   Current Outpatient Medications on File Prior to Visit   Medication Sig " Dispense Refill    DULoxetine (CYMBALTA) 30 mg delayed release capsule Take 30 mg by mouth in the morning and 30 mg in the evening.      multivitamin (THERAGRAN) TABS Take 1 tablet by mouth in the morning.      EPINEPHrine (EPIPEN) 0.3 mg/0.3 mL SOAJ Inject 0.3 mL (0.3 mg total) into a muscle once for 1 dose 0.6 mL 0     No current facility-administered medications on file prior to visit.

## 2025-06-06 ENCOUNTER — CONSULT (OUTPATIENT)
Dept: PAIN MEDICINE | Facility: CLINIC | Age: 20
End: 2025-06-06
Payer: COMMERCIAL

## 2025-06-06 VITALS
BODY MASS INDEX: 23.7 KG/M2 | HEIGHT: 67 IN | SYSTOLIC BLOOD PRESSURE: 104 MMHG | WEIGHT: 151 LBS | DIASTOLIC BLOOD PRESSURE: 66 MMHG | HEART RATE: 89 BPM

## 2025-06-06 DIAGNOSIS — G57.02 PIRIFORMIS SYNDROME OF LEFT SIDE: ICD-10-CM

## 2025-06-06 DIAGNOSIS — M25.552 CHRONIC HIP PAIN, LEFT: ICD-10-CM

## 2025-06-06 DIAGNOSIS — M79.18 MYOFASCIAL PAIN SYNDROME: Primary | ICD-10-CM

## 2025-06-06 DIAGNOSIS — G89.29 CHRONIC HIP PAIN, LEFT: ICD-10-CM

## 2025-06-06 PROCEDURE — 99204 OFFICE O/P NEW MOD 45 MIN: CPT | Performed by: PHYSICAL MEDICINE & REHABILITATION

## 2025-06-06 NOTE — PROGRESS NOTES
Name: Mindy Newman      : 2005      MRN: [id number]  Encounter Provider: Abad Sevilla DO  Encounter Date: 2025   Encounter department: [facility]  :  Assessment & Plan  Myofascial pain syndrome    Orders:    [place] spine and pain procedure; Future    Ambulatory referral to Chiropractic; Future    Piriformis syndrome of left side    Orders:    [place] spine and pain procedure; Future    Ambulatory referral to Chiropractic; Future    Chronic hip pain, left    Orders:    [place] spine and pain procedure; Future    Ambulatory referral to Chiropractic; Future    Ms. Newman is a pleasant 20-year-old female significant past medical history of hypothyroidism, thoracic outlet syndrome presents to [facility] for initial evaluation and referral from orthopedics Dr. MALONE and Dr. Stuart Longoria regarding suspected glutes and lumbar spine pathology contributing to her ongoing chronic left hip pain.  She continues to undergo diagnostic management pending an updated MRI of the left hip and questionable acetabular tear/ANN as one of the main sources for her ongoing pain.  She is reporting and demonstrating radicular symptoms into the anteromedial thigh and questionable if there is a component of lumbar radiculopathy and/or piriformis syndrome.  Given the long Jevity of her chronic pain dating back to 2018 I would not be surprised if there is some compensatory mechanisms over the years that have contributed and complicated the issue.  At this time we will proceed with a left piriformis injection under fluoroscopy guidance for both diagnostic and therapeutic purposes.  We did discuss addressing the lumbar spine as she does have an MRI lumbar spine demonstrating an L4-L5 disc bulge that may be contributing and would consider a isolated left-sided L4-L5 TFESI under fluoroscopy guidance at a later date but for now we will focus on the piriformis involvement.  Will also place the patient  in a chiropractic therapy program.  All questions answered, patient is agreeable with plan I will reach out to Dr. Longoria and Dr. MALONE to give them updates    My impressions and treatment recommendations were discussed in detail with the patient who verbalized understanding and had no further questions.  Discharge instructions were provided. I personally saw and examined the patient and I agree with the above discussed plan of care.    History of Present Illness     Mindy Newman is a 20 y.o. female presents to [facility] for initial evaluation regarding isolated left buttock, hip and lower extremity pain that patient reports started all the way back to 2018 playing softball.  She presents as referral from Dr. MALONE and Dr. Stuart Longoria regarding suspected lumbar spine include pathology contributing to her ongoing chronic pain.  Today patient reports moderate fluctuating 6-8 out of 10 pain described as a throbbing, shooting, sharp, dull aching pain.  Also reports lower extremity weakness and heaviness.  Symptoms are nearly constant 60 to 95% of the time.  Symptoms are worse with standing, bending, sitting, walking, menstruation.  Reports topical gels, NSAIDs have provided some relief as well as Cymbalta 30 mg twice a day.  Physical therapy and exercise has provided relief.  Presents today for initial evaluation.    Review of Systems   Constitutional:  Negative for fever and unexpected weight change.   HENT:  Negative for trouble swallowing.    Eyes:  Negative for visual disturbance.   Respiratory:  Negative for shortness of breath and wheezing.    Cardiovascular:  Negative for chest pain and palpitations.   Gastrointestinal:  Negative for constipation, diarrhea, nausea and vomiting.   Endocrine: Negative for cold intolerance, heat intolerance and polydipsia.   Genitourinary:  Negative for difficulty urinating and frequency.   Musculoskeletal:  Negative for arthralgias, gait problem, joint swelling  "and myalgias.        Left hip pain  Left leg pain  Left foot pain   Skin:  Negative for rash.   Neurological:  Negative for dizziness, seizures, syncope, weakness and headaches.   Hematological:  Does not bruise/bleed easily.   Psychiatric/Behavioral:  Negative for dysphoric mood.    All other systems reviewed and are negative.    Medical History Reviewed by provider this encounter:     .     Objective   /66 (BP Location: Left arm, Patient Position: Sitting, Cuff Size: Standard)   Pulse 89   Ht 5' 6.5\" (1.689 m)   Wt 68.5 kg (151 lb)   BMI 24.01 kg/m²      Pain Score:   3  Physical Exam  Constitutional: normal, well developed, well nourished, alert, in no distress and non-toxic and no overt pain behavior.  Eyes: anicteric  HEENT: grossly intact  Neck: supple, symmetric, trachea midline and no masses   Pulmonary: even and unlabored  Cardiovascular: No edema or pitting edema present  Skin: Normal without rashes or lesions and well hydrated  Psychiatric: Mood and affect appropriate  Neurologic: Cranial Nerves II-XII grossly intact  Musculoskeletal: normal gait, tenderness to palpation left-sided lumbar paraspinals, distal to PSIS and left glutes, decreased active and passive range of motion with internal rotation left leg due to pain and paresthesias into the left anterolateral thigh, MMT 5 out of 5 bilateral lower extremities, sensation grossly intact to light touch, negative straight leg raise bilaterally,  Negative Merlyn left-sided, positive sacral compression testing left-sided, negative sacral distraction left-sided, positive pace left-sided, palliative FADIR left-sided    "

## 2025-06-12 ENCOUNTER — OFFICE VISIT (OUTPATIENT)
Dept: GASTROENTEROLOGY | Facility: CLINIC | Age: 20
End: 2025-06-12
Payer: COMMERCIAL

## 2025-06-12 VITALS
SYSTOLIC BLOOD PRESSURE: 104 MMHG | WEIGHT: 151.4 LBS | TEMPERATURE: 97.2 F | BODY MASS INDEX: 23.76 KG/M2 | HEIGHT: 67 IN | DIASTOLIC BLOOD PRESSURE: 74 MMHG

## 2025-06-12 DIAGNOSIS — R14.0 ABDOMINAL BLOATING: Primary | ICD-10-CM

## 2025-06-12 DIAGNOSIS — R89.4 ABNORMAL CELIAC ANTIBODY PANEL: ICD-10-CM

## 2025-06-12 PROCEDURE — 99204 OFFICE O/P NEW MOD 45 MIN: CPT | Performed by: PHYSICIAN ASSISTANT

## 2025-06-12 RX ORDER — SODIUM CHLORIDE, SODIUM LACTATE, POTASSIUM CHLORIDE, CALCIUM CHLORIDE 600; 310; 30; 20 MG/100ML; MG/100ML; MG/100ML; MG/100ML
125 INJECTION, SOLUTION INTRAVENOUS CONTINUOUS
OUTPATIENT
Start: 2025-06-12

## 2025-06-12 NOTE — PROGRESS NOTES
Name: Mindy Newman      : 2005      MRN: [id number]  Encounter Provider: Pati Santiago PA-C  Encounter Date: 2025   Encounter department: [facility] [place]  :  Assessment & Plan  Abdominal bloating  Patient says that she has had issues with abdominal bloating and distention for many years.  She says she suspects almost every day she will have about 12 bloating and there are many times where it is very severe and extremely distended to the point where people pointed out to her.  She says that over the last few weeks she has changed her diet to include less sugar and alcohol, and has noticed slight improvement.  She says she still gets bloated but it is not as severe as it once was.  TSH within normal limits.Patient underwent celiac panel that was normal except for mildly elevated TT IgG at 8.  -Will get abdominal ultrasound  -Please also ensure you are following up with GYN to rule out any pelvic etiology  -EGD ordered to evaluate for celiac disease and rule out H. pylori, ulcer disease, hiatal hernia  -Start over-the-counter Beano as needed  -Please trial 2-week dairy free diet       Abnormal celiac antibody panel  Patient underwent celiac panel that was normal except for mildly elevated TT IgG at 8.           History of Present Illness   HPI  Mindy Newman is a 20 y.o. female who presents for consultation of abdominal bloating.Patient says that she has had issues with abdominal bloating and distention for many years.  She says she suspects almost every day she will have about 12 bloating and there are many times where it is very severe and extremely distended to the point where people pointed out to her.  She says that over the last few weeks she has changed her diet to include less sugar and alcohol, and has noticed slight improvement.  She says she still gets bloated but it is not as severe as it once was.  Patient says that every once in a while she will get  a pain in her lower abdomen when she gets bloated but this is rare.  She otherwise denies heartburn, nausea or vomiting, changes in bowel habits, family history of colon cancer or stomach cancer, unintentional weight loss, fevers, chills, night sweats, frequent NSAID use, bloody or black bowel movements.  Patient denies prior abdominal surgical history.  Patient says she is now only drinking 1 or 2 alcoholic beverages every other week.  She denies tobacco use.  History obtained from: patient    Review of Systems   Constitutional:  Negative for chills, fever and unexpected weight change.   HENT:  Negative for trouble swallowing.    Gastrointestinal:  Positive for abdominal distention. Negative for abdominal pain, anal bleeding, blood in stool, constipation, diarrhea, nausea, rectal pain and vomiting.   All other systems reviewed and are negative.    Medical History Reviewed by provider this encounter:     .  Past Medical History   Past Medical History[1]  Past Surgical History[2]  Family History[3]   reports that she has never smoked. She has never used smokeless tobacco. She reports current alcohol use of about 2.0 standard drinks of alcohol per week. She reports that she does not use drugs.  Current Outpatient Medications   Medication Instructions    DULoxetine (CYMBALTA) 30 mg, 2 times daily    EPINEPHrine (EPIPEN) 0.3 mg, Intramuscular, Once    multivitamin (THERAGRAN) TABS 1 tablet, Daily   Allergies[4]   Medications Ordered Prior to Encounter[5]   Social History[6]     Objective   There were no vitals taken for this visit.     Physical Exam  Constitutional:       Appearance: Normal appearance.   Abdominal:      General: Bowel sounds are normal. There is no distension.      Palpations: There is no mass.      Tenderness: There is no abdominal tenderness. There is no guarding or rebound.     Neurological:      Mental Status: She is alert.         Administrative Statements   I have spent a total time of 35 minutes  in caring for this patient on the day of the visit/encounter including Diagnostic results, Prognosis, Risks and benefits of tx options, Instructions for management, Patient and family education, Importance of tx compliance, Risk factor reductions, Impressions, Counseling / Coordination of care, Documenting in the medical record, Reviewing/placing orders in the medical record (including tests, medications, and/or procedures), Obtaining or reviewing history  , and Communicating with other healthcare professionals .       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    Disease of thyroid gland     Hypothyroidism    [2]   Past Surgical History:  Procedure Laterality Date    WISDOM TOOTH EXTRACTION     [3]   Family History  Problem Relation Name Age of Onset    Hypertension Mother Nasrin Newman     No Known Problems Father      Cancer Maternal Grandmother Britney Sanches     Heart disease Maternal Grandfather Vick Sanches     Heart disease Paternal Grandmother Cecilia Newman     Breast cancer Paternal Grandmother Cecilia Newman     Cancer Paternal Grandmother Cecilia Newman     Heart disease Paternal Grandfather Joss Newman    [4]   Allergies  Allergen Reactions    Fish Allergy - Food Allergy Itching, Swelling, Throat Swelling and Tongue Swelling    Shellfish Allergy - Food Allergy Cough, Itching, Swelling, Throat Swelling and Tongue Swelling   [5]   Current Outpatient Medications on File Prior to Visit   Medication Sig Dispense Refill    DULoxetine (CYMBALTA) 30 mg delayed release capsule Take 30 mg by mouth in the morning and 30 mg in the evening.      EPINEPHrine (EPIPEN) 0.3 mg/0.3 mL SOAJ Inject 0.3 mL (0.3 mg total) into a muscle once for 1 dose 0.6 mL 0    multivitamin (THERAGRAN) TABS Take 1 tablet by mouth in the morning.       No current facility-administered medications on file prior to visit.   [6]   Social History  Tobacco Use    Smoking status: Never    Smokeless tobacco:  Never   Vaping Use    Vaping status: Never Used   Substance and Sexual Activity    Alcohol use: Yes     Alcohol/week: 2.0 standard drinks of alcohol     Types: 2 Standard drinks or equivalent per week     Comment: social    Drug use: Never    Sexual activity: Not Currently     Partners: Male     Birth control/protection: Abstinence     Comment: In the past used OCP

## 2025-06-12 NOTE — PATIENT INSTRUCTIONS
Beano (over-the-counter) as needed for bloating   2-week dairy-free diet strictly   Continue eating gluten on a regular basis and less sugar   GYN       Printed orders for patient - she will schedule EGD and US at a later time.

## 2025-06-16 DIAGNOSIS — T78.40XA ALLERGY, INITIAL ENCOUNTER: ICD-10-CM

## 2025-06-17 ENCOUNTER — TELEPHONE (OUTPATIENT)
Age: 20
End: 2025-06-17

## 2025-06-17 ENCOUNTER — HOSPITAL ENCOUNTER (OUTPATIENT)
Dept: RADIOLOGY | Facility: CLINIC | Age: 20
Discharge: HOME | End: 2025-06-17
Attending: PHYSICAL MEDICINE & REHABILITATION | Admitting: PHYSICAL MEDICINE & REHABILITATION
Payer: COMMERCIAL

## 2025-06-17 VITALS
TEMPERATURE: 98.3 F | OXYGEN SATURATION: 96 % | RESPIRATION RATE: 20 BRPM | HEART RATE: 76 BPM | SYSTOLIC BLOOD PRESSURE: 105 MMHG | DIASTOLIC BLOOD PRESSURE: 69 MMHG

## 2025-06-17 DIAGNOSIS — M25.552 CHRONIC HIP PAIN, LEFT: ICD-10-CM

## 2025-06-17 DIAGNOSIS — M79.18 MYOFASCIAL PAIN SYNDROME: ICD-10-CM

## 2025-06-17 DIAGNOSIS — G57.02 PIRIFORMIS SYNDROME OF LEFT SIDE: ICD-10-CM

## 2025-06-17 DIAGNOSIS — G89.29 CHRONIC HIP PAIN, LEFT: ICD-10-CM

## 2025-06-17 PROCEDURE — 77002 NEEDLE LOCALIZATION BY XRAY: CPT | Performed by: PHYSICAL MEDICINE & REHABILITATION

## 2025-06-17 PROCEDURE — 20552 NJX 1/MLT TRIGGER POINT 1/2: CPT | Performed by: PHYSICAL MEDICINE & REHABILITATION

## 2025-06-17 RX ORDER — BUPIVACAINE HCL/PF 2.5 MG/ML
3 VIAL (ML) INJECTION ONCE
Status: COMPLETED | OUTPATIENT
Start: 2025-06-17 | End: 2025-06-17

## 2025-06-17 RX ORDER — METHYLPREDNISOLONE ACETATE 40 MG/ML
40 INJECTION, SUSPENSION INTRA-ARTICULAR; INTRALESIONAL; INTRAMUSCULAR; PARENTERAL; SOFT TISSUE ONCE
Status: COMPLETED | OUTPATIENT
Start: 2025-06-17 | End: 2025-06-17

## 2025-06-17 RX ORDER — EPINEPHRINE 0.3 MG/.3ML
0.3 INJECTION SUBCUTANEOUS ONCE
Qty: 0.6 ML | Refills: 0 | Status: SHIPPED | OUTPATIENT
Start: 2025-06-17 | End: 2025-06-20

## 2025-06-17 RX ORDER — LIDOCAINE HYDROCHLORIDE 10 MG/ML
2 INJECTION, SOLUTION EPIDURAL; INFILTRATION; INTRACAUDAL; PERINEURAL ONCE
Status: COMPLETED | OUTPATIENT
Start: 2025-06-17 | End: 2025-06-17

## 2025-06-17 RX ADMIN — LIDOCAINE HYDROCHLORIDE 2 ML: 10 INJECTION, SOLUTION EPIDURAL; INFILTRATION; INTRACAUDAL; PERINEURAL at 15:52

## 2025-06-17 RX ADMIN — METHYLPREDNISOLONE ACETATE 40 MG: 40 INJECTION, SUSPENSION INTRA-ARTICULAR; INTRALESIONAL; INTRAMUSCULAR; SOFT TISSUE at 15:54

## 2025-06-17 RX ADMIN — IOHEXOL 1 ML: 300 INJECTION, SOLUTION INTRAVENOUS at 15:53

## 2025-06-17 RX ADMIN — BUPIVACAINE HYDROCHLORIDE 3 ML: 2.5 INJECTION, SOLUTION EPIDURAL; INFILTRATION; INTRACAUDAL at 15:54

## 2025-06-17 NOTE — H&P
History of Present Illness: The patient is a 20 y.o. female who presents with complaints of left sided buttock pain    Past Medical History[1]    Past Surgical History[2]    Current Medications[3]    Allergies[4]    Physical Exam: There were no vitals filed for this visit.  General: Awake, Alert, Oriented x 3, Mood and affect appropriate  Respiratory: Respirations even and unlabored  Cardiovascular: Peripheral pulses intact; no edema  Musculoskeletal Exam: Tenderness palpation left-sided glutes    ASA Score: 2         Assessment:   1. Myofascial pain syndrome    2. Piriformis syndrome of left side    3. 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    Disease of thyroid gland     Hypothyroidism    [2]   Past Surgical History:  Procedure Laterality Date    WISDOM TOOTH EXTRACTION     [3]   Current Outpatient Medications:     DULoxetine (CYMBALTA) 30 mg delayed release capsule, Take 30 mg by mouth in the morning and 30 mg in the evening., Disp: , Rfl:     EPINEPHrine (EPIPEN) 0.3 mg/0.3 mL SOAJ, Inject 0.3 mL (0.3 mg total) into a muscle once for 1 dose, Disp: 0.6 mL, Rfl: 0    multivitamin (THERAGRAN) TABS, Take 1 tablet by mouth in the morning., Disp: , Rfl:     Current Facility-Administered Medications:     bupivacaine (PF) (MARCAINE) 0.25 % injection 3 mL, 3 mL, Intra-articular, Once, Abad Sevilla DO    iohexol (OMNIPAQUE) 300 mg/mL injection 1 mL, 1 mL, Intra-articular, Once, Abad Sevilla DO    lidocaine (PF) (XYLOCAINE-MPF) 1 % injection 2 mL, 2 mL, Infiltration, Once, Abad Sevilla DO    methylPREDNISolone acetate (DEPO-MEDROL) injection 40 mg, 40 mg, Intra-articular, Once, Abad Sevilla DO  [4]   Allergies  Allergen Reactions    Fish Allergy - Food Allergy Itching, Swelling, Throat Swelling and Tongue Swelling    Shellfish Allergy - Food Allergy Cough, Itching, Swelling, Throat Swelling  and Tongue Swelling

## 2025-06-17 NOTE — TELEPHONE ENCOUNTER
Caller: Patient    Doctor: Dr. DR BROOKS    Reason for call: Patient lost her procedure instruction ppw and would like to know if she would have to fill anything out before procedure    Call back#:

## 2025-06-17 NOTE — DISCHARGE INSTR - LAB
Do not apply heat to any area that is numb. If you have discomfort or soreness at the injection site, you may apply ice today, 20 minutes on and 20 minutes off. Tomorrow you may use ice or warm, moist heat. Do not apply ice or heat directly to the skin.  If you experience severe shortness of breath, go to the Emergency Room.  You may have numbness for several hours from the local anesthetic. Please use caution and common sense, especially with weight-bearing activities.  You may have an increase or change in the discomfort for 36-48 hours after your treatment. Apply ice and continue with any pain medicine you have been prescribed.  Do not do anything strenuous today. You may shower, but no tub baths or hot tubs today. You may resume your normal activities tomorrow, but do not “overdo it”. Resume normal activities slowly when you are feeling better.  If you experience redness, drainage or swelling at the injection site, or if you develop a fever above 100 degrees, please call [facility] at [phone] or go to the Emergency Room.  Continue to take all routine medicines prescribed by your primary care physician unless otherwise instructed by our staff. Most blood thinners should be started again according to your regularly scheduled dosing. If you have any questions, please give our office a call.    As no general anesthesia was used in today's procedure, you should not experience any side effects related to anesthesia.       If you have a problem specifically related to your procedure, please call our office at [phone].  Problems not related to your procedure should be directed to your primary care physician.

## 2025-06-20 ENCOUNTER — OFFICE VISIT (OUTPATIENT)
Age: 20
End: 2025-06-20
Payer: COMMERCIAL

## 2025-06-20 VITALS — HEIGHT: 67 IN | WEIGHT: 151 LBS | BODY MASS INDEX: 23.7 KG/M2

## 2025-06-20 DIAGNOSIS — M99.02 SEGMENTAL DYSFUNCTION OF THORACIC REGION: ICD-10-CM

## 2025-06-20 DIAGNOSIS — M79.18 MYOFASCIAL PAIN SYNDROME: ICD-10-CM

## 2025-06-20 DIAGNOSIS — G57.02 PIRIFORMIS SYNDROME OF LEFT SIDE: ICD-10-CM

## 2025-06-20 DIAGNOSIS — M25.552 CHRONIC HIP PAIN, LEFT: ICD-10-CM

## 2025-06-20 DIAGNOSIS — G89.29 CHRONIC HIP PAIN, LEFT: ICD-10-CM

## 2025-06-20 DIAGNOSIS — M99.04 SEGMENTAL DYSFUNCTION OF SACRAL REGION: Primary | ICD-10-CM

## 2025-06-20 DIAGNOSIS — M99.03 SEGMENTAL DYSFUNCTION OF LUMBAR REGION: ICD-10-CM

## 2025-06-20 DIAGNOSIS — M99.01 SEGMENTAL DYSFUNCTION OF CERVICAL REGION: ICD-10-CM

## 2025-06-20 PROCEDURE — 98941 CHIROPRACT MANJ 3-4 REGIONS: CPT | Performed by: CHIROPRACTOR

## 2025-06-20 PROCEDURE — 97110 THERAPEUTIC EXERCISES: CPT | Performed by: CHIROPRACTOR

## 2025-06-20 PROCEDURE — 99203 OFFICE O/P NEW LOW 30 MIN: CPT | Performed by: CHIROPRACTOR

## 2025-06-20 RX ORDER — IBUPROFEN 200 MG
400 TABLET ORAL EVERY 6 HOURS PRN
COMMUNITY

## 2025-06-20 NOTE — PROGRESS NOTES
Initial date of service:6/20/25    Diagnoses and all orders for this visit:    Segmental dysfunction of sacral region    Myofascial pain syndrome  -     Ambulatory referral to Chiropractic    Piriformis syndrome of left side  -     Ambulatory referral to Chiropractic    Chronic hip pain, left  -     Ambulatory referral to Chiropractic    Segmental dysfunction of lumbar region    Segmental dysfunction of thoracic region    Segmental dysfunction of cervical region    Other orders  -     ibuprofen (MOTRIN) 200 mg tablet; Take 400 mg by mouth every 6 (six) hours as needed for mild pain       ASSESSMENT:  No red flags, radiculopathy or neurologic deficit appreciated clinically. Pt's symptoms and exam findings consistent with mechanical lbp secondary to repetitive st/sp injury, exacerbated by postural/ergonomic stressors. Neural tension was not present at time of appt but symptoms will be monitored. Pt responded well to flexion biased stretches and manual mobilization of the affected spinal and myofascial tissues with increased ROM; trial of conservative tx recommended consisting of stretching, graded mobilization/manipulation of the affected spinal and myofascial jt dysfunction, postural/ergonomic education and take home stretches/exercises. If symptoms fail to improve with short trial of conservative care, appropriate imaging and referral will be coordinated.  Spent greater than 30 min c pt discussing hx, pe, ddx, tx options and reviewing notes/imaging    PROCEDURE CODES: 88982 and 67279, 99587    TREATMENT:  Fear avoidance behavior discussion; encouraged and reassured pt that natural course of condition is to improve over time with adherence to tx plan and home care strategies. Home care recommendations: avoid bed rest, walk (but avoid trails and uneven surfaces), gradual return to activity to tolerance (avoid anything that peripheralizes symptoms), call if symptoms peripheralize, worsen, or neurologic deficit  progresses. Ther-ex: IASTM; discussed post procedure soreness and/or ecchymosis for up to 36 hrs, applied to affected mm hypertonicities; supine hamstring stretch, supine gluteal stretch, side laying QL stretch, single knee to chest stretch, hip flexor pin-and-stretch, alternating prone hip extension, glute bridge, transitional mvmt education, abdominal bracing; greater than 15 min spent performing above mentioned ther-ex to improve ROM/flexibility. Thoracic mobilization/manipulation: prone P-A mob, Lumbar mobilization/manipulation: diversified side laying graded HVLA, flexion-traction; SIJ Manipulation/Mobilization: R/L SIJ HVLA - long axis distraction, callahan drop table maneuver to affected SIJ    HPI:  Mindy Newman is a 20 y.o. female  Chief Complaint   Patient presents with   • Back Pain     Middle back pain-8  Lower back pain-8  Numbness and tingling radiate down left leg    • Hip Pain     Hip pain-8     The patient presents to the office with lower back pain and hip pain that is chronic in nature, it has been slowly getting worse over the past 3 years. She feels like L leg is longer, and it feels like the hip does not feel like its fitting in the socket. Leg feels heavier and has numbness and tingling sensation into the leg. The patient works with PT a while ago. It always pops and cracks.  Dr Abad Sevilla has been seeing here for 2 wks and he referred pt for consult and co-management. Piriformis injection near the SIJ. 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 Pt has MRI of hip ordered but not scheduled yet.    Back Pain  Pertinent negatives include no chest pain, fever, headaches, numbness or weakness.   Hip Pain   Pertinent negatives include no numbness.     Past Medical History[1]   Past Surgical History[2]  The following portions of the patient's history were reviewed and updated as appropriate: allergies, past family history, past medical history, past social history, past surgical history, and  problem list.  Review of Systems   Constitutional:  Negative for activity change, fatigue, fever and unexpected weight change.   HENT:  Negative for ear pain, hearing loss, sinus pressure, sinus pain, sore throat and tinnitus.    Respiratory:  Negative for chest tightness, shortness of breath, wheezing and stridor.    Cardiovascular:  Negative for chest pain.   Genitourinary:  Negative for flank pain and frequency.   Musculoskeletal:  Positive for back pain and myalgias. Negative for joint swelling, neck pain and neck stiffness.   Skin:  Negative for color change and pallor.   Neurological:  Negative for dizziness, speech difficulty, weakness, numbness and headaches.   Psychiatric/Behavioral:  Negative for agitation and sleep disturbance. The patient is not nervous/anxious.      Physical Exam  Constitutional:       General: She is not in acute distress.     Appearance: Normal appearance.   HENT:      Head: Normocephalic.      Mouth/Throat:      Mouth: Mucous membranes are moist.     Eyes:      Extraocular Movements: Extraocular movements intact.      Conjunctiva/sclera: Conjunctivae normal.      Pupils: Pupils are equal, round, and reactive to light.     Neck:      Vascular: No carotid bruit.   Pulmonary:      Effort: Pulmonary effort is normal.   Chest:      Chest wall: No tenderness.   Abdominal:      General: Abdomen is flat.      Palpations: Abdomen is soft.     Musculoskeletal:         General: Tenderness present. No swelling, deformity or signs of injury. Normal range of motion.      Cervical back: Normal range of motion. No rigidity or tenderness.      Right lower leg: No edema.      Left lower leg: No edema.        Legs:    Lymphadenopathy:      Cervical: No cervical adenopathy.     Skin:     General: Skin is warm.      Coloration: Skin is not jaundiced or pale.      Findings: No bruising or erythema.     Neurological:      Mental Status: She is alert and oriented to person, place, and time.      Cranial  Nerves: No cranial nerve deficit.      Sensory: No sensory deficit.      Motor: No weakness.      Gait: Gait is intact.      Deep Tendon Reflexes: Reflexes are normal and symmetric.     Psychiatric:         Attention and Perception: Attention normal.         Mood and Affect: Mood and affect normal.         Speech: Speech normal.         Behavior: Behavior normal. Behavior is cooperative.         Thought Content: Thought content normal.         Cognition and Memory: Cognition normal.         Judgment: Judgment normal.       SOFT TISSUE ASSESSMENT Hypertonicity and tenderness palpated B T10-S1 erector spinae, hip flexor, glute med/min, QL, hamstring, L TFL JOINT RESTRICTIONS: T10-S1 and L SIJ ORTHO: SI jt point tenderness: +; Jazzmine unremarkable for centralization/peripheralization; kiera's, iliac compression, thigh thrust elicit lbp in L SIJ; prone femoral nerve stretch neg for upper lumbar neural tension, elicits R/L SIJ stiffness; sitting root elicits no lbp on R/L; slump test elicits no neural tension R/L    Return in about 1 week (around 6/27/2025).         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

## 2025-06-24 ENCOUNTER — HOSPITAL ENCOUNTER (OUTPATIENT)
Facility: MEDICAL CENTER | Age: 20
Discharge: HOME | End: 2025-06-24
Attending: STUDENT IN AN ORGANIZED HEALTH CARE EDUCATION/TRAINING PROGRAM
Payer: COMMERCIAL

## 2025-06-24 DIAGNOSIS — S73.192A TEAR OF LEFT ACETABULAR LABRUM, INITIAL ENCOUNTER: ICD-10-CM

## 2025-06-24 PROCEDURE — 73721 MRI JNT OF LWR EXTRE W/O DYE: CPT

## 2025-06-29 ENCOUNTER — HOSPITAL ENCOUNTER (OUTPATIENT)
Dept: ULTRASOUND IMAGING | Facility: HOSPITAL | Age: 20
Discharge: HOME | End: 2025-06-29
Payer: COMMERCIAL

## 2025-06-29 DIAGNOSIS — R14.0 ABDOMINAL BLOATING: ICD-10-CM

## 2025-06-29 PROCEDURE — 76700 US EXAM ABDOM COMPLETE: CPT

## 2025-06-30 ENCOUNTER — PROCEDURE VISIT (OUTPATIENT)
Age: 20
End: 2025-06-30
Payer: COMMERCIAL

## 2025-06-30 VITALS — WEIGHT: 151 LBS | BODY MASS INDEX: 23.7 KG/M2 | HEIGHT: 67 IN

## 2025-06-30 DIAGNOSIS — G89.29 CHRONIC HIP PAIN, LEFT: ICD-10-CM

## 2025-06-30 DIAGNOSIS — M99.01 SEGMENTAL DYSFUNCTION OF CERVICAL REGION: ICD-10-CM

## 2025-06-30 DIAGNOSIS — M99.03 SEGMENTAL DYSFUNCTION OF LUMBAR REGION: ICD-10-CM

## 2025-06-30 DIAGNOSIS — M25.552 CHRONIC HIP PAIN, LEFT: ICD-10-CM

## 2025-06-30 DIAGNOSIS — M99.04 SEGMENTAL DYSFUNCTION OF SACRAL REGION: ICD-10-CM

## 2025-06-30 DIAGNOSIS — G57.02 PIRIFORMIS SYNDROME OF LEFT SIDE: ICD-10-CM

## 2025-06-30 DIAGNOSIS — M99.02 SEGMENTAL DYSFUNCTION OF THORACIC REGION: ICD-10-CM

## 2025-06-30 DIAGNOSIS — M79.18 MYOFASCIAL PAIN SYNDROME: Primary | ICD-10-CM

## 2025-06-30 PROCEDURE — 98941 CHIROPRACT MANJ 3-4 REGIONS: CPT | Performed by: CHIROPRACTOR

## 2025-06-30 PROCEDURE — 97110 THERAPEUTIC EXERCISES: CPT | Performed by: CHIROPRACTOR

## 2025-06-30 NOTE — PROGRESS NOTES
Initial date of service:6/20/25    Diagnoses and all orders for this visit:    Myofascial pain syndrome    Piriformis syndrome of left side    Chronic hip pain, left    Segmental dysfunction of sacral region    Segmental dysfunction of lumbar region    Segmental dysfunction of thoracic region    Segmental dysfunction of cervical region       ASSESSMENT:  Pt's symptoms and exam findings consistent with mechanical lbp secondary to repetitive st/sp injury, exacerbated by postural/ergonomic stressors. Neural tension was not present at time of appt but symptoms will be monitored. Pt responded well to flexion biased stretches and manual mobilization of the affected spinal and myofascial tissues with increased ROM; trial of conservative tx recommended consisting of stretching, graded mobilization/manipulation of the affected spinal and myofascial jt dysfunction, postural/ergonomic education and take home stretches/exercises. If symptoms fail to improve with short trial of conservative care, appropriate imaging and referral will be coordinated.  6/30/25- The patient is feeling a little better with lower back pain post-treatment.     PROCEDURE CODES: 13773 and 35979    TREATMENT:  Fear avoidance behavior discussion; encouraged and reassured pt that natural course of condition is to improve over time with adherence to tx plan and home care strategies. Home care recommendations: avoid bed rest, walk (but avoid trails and uneven surfaces), gradual return to activity to tolerance (avoid anything that peripheralizes symptoms), call if symptoms peripheralize, worsen, or neurologic deficit progresses. Ther-ex: IASTM; discussed post procedure soreness and/or ecchymosis for up to 36 hrs, applied to affected mm hypertonicities; supine hamstring stretch, supine gluteal stretch, side laying QL stretch, single knee to chest stretch, hip flexor pin-and-stretch, alternating prone hip extension, glute bridge, transitional mvmt education,  abdominal bracing; greater than 15 min spent performing above mentioned ther-ex to improve ROM/flexibility. Thoracic mobilization/manipulation: prone P-A mob, Lumbar mobilization/manipulation: diversified side laying graded HVLA, flexion-traction; SIJ Manipulation/Mobilization: R/L SIJ HVLA - long axis distraction, callahan drop table maneuver to affected SIJ    HPI:  Mindy Newman is a 20 y.o. female  Chief Complaint   Patient presents with   • Middle Back - Pain     Pain level 3 or 4    • Left Hip - Pain     Pain level is 3 or 4     The patient presents to the office with lower back pain and hip pain that is chronic in nature, it has been slowly getting worse over the past 3 years. She feels like L leg is longer, and it feels like the hip does not feel like its fitting in the socket. Leg feels heavier and has numbness and tingling sensation into the leg. The patient works with PT a while ago. It always pops and cracks.  Dr Abad Sevilla has been seeing here for 2 wks and he referred pt for consult and co-management. Piriformis injection near the SIJ. 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 Pt has MRI of hip ordered but not scheduled yet.  6/30/25- The patient is feeling 3-4/10 in the lower back pain and L hip, more flare up the next day after last visit around the bra line.     Back Pain    Hip Pain       Past Medical History[1]   Past Surgical History[2]  The following portions of the patient's history were reviewed and updated as appropriate: allergies, past family history, past medical history, past social history, past surgical history, and problem list.  Review of Systems   Musculoskeletal:  Positive for back pain and myalgias.     Physical Exam  Constitutional:       Appearance: Normal appearance.     Musculoskeletal:         General: Tenderness present. Normal range of motion.      Cervical back: Normal range of motion.        Legs:      Neurological:      Mental Status: She is alert and oriented to  person, place, and time.      Gait: Gait is intact.      Deep Tendon Reflexes: Reflexes are normal and symmetric.     Psychiatric:         Attention and Perception: Attention normal.         Mood and Affect: Mood and affect normal.         Speech: Speech normal.         Behavior: Behavior normal. Behavior is cooperative.         Thought Content: Thought content normal.         Cognition and Memory: Cognition normal.         Judgment: Judgment normal.       SOFT TISSUE ASSESSMENT Hypertonicity and tenderness palpated B T10-S1 erector spinae, hip flexor, glute med/min, QL, hamstring, L TFL JOINT RESTRICTIONS: T10-S1 and L SIJ ORTHO: SI jt point tenderness: +; Jazzmine unremarkable for centralization/peripheralization; kiera's, iliac compression, thigh thrust elicit lbp in L SIJ; prone femoral nerve stretch neg for upper lumbar neural tension, elicits R/L SIJ stiffness; sitting root elicits no lbp on R/L; slump test elicits no neural tension R/L    Return in about 1 week (around 7/7/2025) for Recheck.         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

## 2025-07-01 ENCOUNTER — TELEPHONE (OUTPATIENT)
Dept: RADIOLOGY | Facility: MEDICAL CENTER | Age: 20
End: 2025-07-01

## 2025-07-03 NOTE — TELEPHONE ENCOUNTER
Caller: pt Mindy   Doctor/office: Dr Sevilla  CB#: [phone]    % of improvement: first week 60 to 70 right now back to 15 %     Pain Scale (1-10): last week pain was a 1, up till today a 7    Pt stated that she did go to the chiropractor that was recommended and did receive a little relief.

## 2025-07-07 ENCOUNTER — TELEPHONE (OUTPATIENT)
Age: 20
End: 2025-07-07

## 2025-07-07 NOTE — TELEPHONE ENCOUNTER
Caller: Patient    Doctor: Dr. Chirinos    Reason for call: Patient returned call to schedule virtual appt. Scheduled for 7/14 4pm    Call back#: [phone]

## 2025-07-14 ENCOUNTER — TELEMEDICINE (OUTPATIENT)
Dept: OBGYN CLINIC | Facility: CLINIC | Age: 20
End: 2025-07-14
Payer: COMMERCIAL

## 2025-07-14 VITALS — BODY MASS INDEX: 23.7 KG/M2 | WEIGHT: 151 LBS | HEIGHT: 67 IN

## 2025-07-14 DIAGNOSIS — G57.02 PIRIFORMIS SYNDROME, LEFT: ICD-10-CM

## 2025-07-14 DIAGNOSIS — S73.192A TEAR OF LEFT ACETABULAR LABRUM, INITIAL ENCOUNTER: Primary | ICD-10-CM

## 2025-07-14 DIAGNOSIS — M25.852 FEMOROACETABULAR IMPINGEMENT OF LEFT HIP: ICD-10-CM

## 2025-07-14 PROCEDURE — 99213 OFFICE O/P EST LOW 20 MIN: CPT | Performed by: STUDENT IN AN ORGANIZED HEALTH CARE EDUCATION/TRAINING PROGRAM

## 2025-07-18 ENCOUNTER — PROCEDURE VISIT (OUTPATIENT)
Age: 20
End: 2025-07-18
Payer: COMMERCIAL

## 2025-07-18 VITALS — WEIGHT: 151 LBS | HEIGHT: 67 IN | BODY MASS INDEX: 23.7 KG/M2

## 2025-07-18 DIAGNOSIS — M99.03 SEGMENTAL DYSFUNCTION OF LUMBAR REGION: ICD-10-CM

## 2025-07-18 DIAGNOSIS — M99.02 SEGMENTAL DYSFUNCTION OF THORACIC REGION: ICD-10-CM

## 2025-07-18 DIAGNOSIS — G89.29 CHRONIC HIP PAIN, LEFT: ICD-10-CM

## 2025-07-18 DIAGNOSIS — M25.552 CHRONIC HIP PAIN, LEFT: ICD-10-CM

## 2025-07-18 DIAGNOSIS — M99.01 SEGMENTAL DYSFUNCTION OF CERVICAL REGION: ICD-10-CM

## 2025-07-18 DIAGNOSIS — M99.04 SEGMENTAL DYSFUNCTION OF SACRAL REGION: ICD-10-CM

## 2025-07-18 DIAGNOSIS — G57.02 PIRIFORMIS SYNDROME OF LEFT SIDE: ICD-10-CM

## 2025-07-18 DIAGNOSIS — M79.18 MYOFASCIAL PAIN SYNDROME: Primary | ICD-10-CM

## 2025-07-18 PROCEDURE — 97110 THERAPEUTIC EXERCISES: CPT | Performed by: CHIROPRACTOR

## 2025-07-18 PROCEDURE — 98941 CHIROPRACT MANJ 3-4 REGIONS: CPT | Performed by: CHIROPRACTOR

## 2025-07-18 NOTE — PROGRESS NOTES
Initial date of service:6/20/25    Diagnoses and all orders for this visit:    Myofascial pain syndrome    Piriformis syndrome of left side    Chronic hip pain, left    Segmental dysfunction of sacral region    Segmental dysfunction of lumbar region    Segmental dysfunction of thoracic region    Segmental dysfunction of cervical region       ASSESSMENT:  Pt's symptoms and exam findings consistent with mechanical lbp secondary to repetitive st/sp injury, exacerbated by postural/ergonomic stressors. Neural tension was not present at time of appt but symptoms will be monitored. Pt responded well to flexion biased stretches and manual mobilization of the affected spinal and myofascial tissues with increased ROM; trial of conservative tx recommended consisting of stretching, graded mobilization/manipulation of the affected spinal and myofascial jt dysfunction, postural/ergonomic education and take home stretches/exercises. If symptoms fail to improve with short trial of conservative care, appropriate imaging and referral will be coordinated.  6/30/25- The patient is feeling a little better with lower back pain post-treatment.   7/18/25- The pt tolerated treatment well today.    PROCEDURE CODES: 80438 and [id number]    TREATMENT:  Fear avoidance behavior discussion; encouraged and reassured pt that natural course of condition is to improve over time with adherence to tx plan and home care strategies. Home care recommendations: avoid bed rest, walk (but avoid trails and uneven surfaces), gradual return to activity to tolerance (avoid anything that peripheralizes symptoms), call if symptoms peripheralize, worsen, or neurologic deficit progresses. Ther-ex: IASTM; discussed post procedure soreness and/or ecchymosis for up to 36 hrs, applied to affected mm hypertonicities; supine hamstring stretch, supine gluteal stretch, side laying QL stretch, single knee to chest stretch, hip flexor pin-and-stretch, alternating prone hip  extension, glute bridge, transitional mvmt education, abdominal bracing; greater than 15 min spent performing above mentioned ther-ex to improve ROM/flexibility. Thoracic mobilization/manipulation: prone P-A mob, Lumbar mobilization/manipulation: diversified side laying graded HVLA, flexion-traction; SIJ Manipulation/Mobilization: R/L SIJ HVLA - long axis distraction, callahan drop table maneuver to affected SIJ    HPI:  Mindy Newman is a 20 y.o. female  Chief Complaint   Patient presents with   • Back Pain     Middle back pain-3  Lower back pain-3   • Hip Pain     Hip pain-3     The patient presents to the office with lower back pain and hip pain that is chronic in nature, it has been slowly getting worse over the past 3 years. She feels like L leg is longer, and it feels like the hip does not feel like its fitting in the socket. Leg feels heavier and has numbness and tingling sensation into the leg. The patient works with PT a while ago. It always pops and cracks.  Dr Abad Sevilla has been seeing here for 2 wks and he referred pt for consult and co-management. Piriformis injection near the SIJ. 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 Pt has MRI of hip ordered but not scheduled yet.  6/30/25- The patient is feeling 3-4/10 in the lower back pain and L hip, more flare up the next day after last visit around the bra line.   7/18/25- The patient reports she had a massage today, she mentions she was tight in the lower back 3/10 in lower back and hip.     Back Pain    Hip Pain       Past Medical History[1]   Past Surgical History[1]  The following portions of the patient's history were reviewed and updated as appropriate: allergies, past family history, past medical history, past social history, past surgical history, and problem list.  Review of Systems   Musculoskeletal:  Positive for back pain and myalgias.     Physical Exam  Constitutional:       Appearance: Normal appearance.     Musculoskeletal:          General: Tenderness present. Normal range of motion.      Cervical back: Normal range of motion.        Legs:      Neurological:      Mental Status: She is alert and oriented to person, place, and time.      Gait: Gait is intact.      Deep Tendon Reflexes: Reflexes are normal and symmetric.     Psychiatric:         Attention and Perception: Attention normal.         Mood and Affect: Mood and affect normal.         Speech: Speech normal.         Behavior: Behavior normal. Behavior is cooperative.         Thought Content: Thought content normal.         Cognition and Memory: Cognition normal.         Judgment: Judgment normal.       SOFT TISSUE ASSESSMENT Hypertonicity and tenderness palpated B T10-S1 erector spinae, hip flexor, glute med/min, QL, hamstring, L TFL JOINT RESTRICTIONS: T10-S1 and L SIJ ORTHO: SI jt point tenderness: +; Jazzmine unremarkable for centralization/peripheralization; kiera's, iliac compression, thigh thrust elicit lbp in L SIJ; prone femoral nerve stretch neg for upper lumbar neural tension, elicits R/L SIJ stiffness; sitting root elicits no lbp on R/L; slump test elicits no neural tension R/L    Return in about 1 week (around 7/25/2025) for Recheck.           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

## 2025-08-04 ENCOUNTER — PROCEDURE VISIT (OUTPATIENT)
Age: 20
End: 2025-08-04
Payer: COMMERCIAL

## 2025-08-04 VITALS — WEIGHT: 151 LBS | HEIGHT: 67 IN | BODY MASS INDEX: 23.7 KG/M2

## 2025-08-04 DIAGNOSIS — M99.03 SEGMENTAL DYSFUNCTION OF LUMBAR REGION: ICD-10-CM

## 2025-08-04 DIAGNOSIS — G89.29 CHRONIC HIP PAIN, LEFT: ICD-10-CM

## 2025-08-04 DIAGNOSIS — M79.18 MYOFASCIAL PAIN SYNDROME: Primary | ICD-10-CM

## 2025-08-04 DIAGNOSIS — M99.04 SEGMENTAL DYSFUNCTION OF SACRAL REGION: ICD-10-CM

## 2025-08-04 DIAGNOSIS — G57.02 PIRIFORMIS SYNDROME OF LEFT SIDE: ICD-10-CM

## 2025-08-04 DIAGNOSIS — M99.02 SEGMENTAL DYSFUNCTION OF THORACIC REGION: ICD-10-CM

## 2025-08-04 DIAGNOSIS — M25.552 CHRONIC HIP PAIN, LEFT: ICD-10-CM

## 2025-08-04 DIAGNOSIS — M99.01 SEGMENTAL DYSFUNCTION OF CERVICAL REGION: ICD-10-CM

## 2025-08-04 PROCEDURE — 98941 CHIROPRACT MANJ 3-4 REGIONS: CPT | Performed by: CHIROPRACTOR

## 2025-08-04 PROCEDURE — 97110 THERAPEUTIC EXERCISES: CPT | Performed by: CHIROPRACTOR

## 2025-08-19 ENCOUNTER — PREP FOR PROCEDURE (OUTPATIENT)
Age: 20
End: 2025-08-19

## 2025-08-19 ENCOUNTER — TELEPHONE (OUTPATIENT)
Age: 20
End: 2025-08-19